# Patient Record
Sex: FEMALE | Race: WHITE | Employment: FULL TIME | URBAN - METROPOLITAN AREA
[De-identification: names, ages, dates, MRNs, and addresses within clinical notes are randomized per-mention and may not be internally consistent; named-entity substitution may affect disease eponyms.]

---

## 2017-06-28 ENCOUNTER — ED HISTORICAL/CONVERTED ENCOUNTER (OUTPATIENT)
Dept: OTHER | Age: 32
End: 2017-06-28

## 2017-08-14 ENCOUNTER — OP HISTORICAL/CONVERTED ENCOUNTER (OUTPATIENT)
Dept: OTHER | Age: 32
End: 2017-08-14

## 2021-08-16 ENCOUNTER — OFFICE VISIT (OUTPATIENT)
Dept: OBGYN CLINIC | Age: 36
End: 2021-08-16
Payer: COMMERCIAL

## 2021-08-16 VITALS
SYSTOLIC BLOOD PRESSURE: 124 MMHG | HEIGHT: 62 IN | DIASTOLIC BLOOD PRESSURE: 76 MMHG | WEIGHT: 180.38 LBS | BODY MASS INDEX: 33.19 KG/M2

## 2021-08-16 DIAGNOSIS — Z12.4 SCREENING FOR MALIGNANT NEOPLASM OF CERVIX: ICD-10-CM

## 2021-08-16 DIAGNOSIS — Z01.419 ROUTINE GYNECOLOGICAL EXAMINATION: Primary | ICD-10-CM

## 2021-08-16 DIAGNOSIS — N94.6 DYSMENORRHEA: ICD-10-CM

## 2021-08-16 PROCEDURE — 99395 PREV VISIT EST AGE 18-39: CPT | Performed by: OBSTETRICS & GYNECOLOGY

## 2021-08-16 RX ORDER — IBUPROFEN 800 MG/1
800 TABLET ORAL
Qty: 30 TABLET | Refills: 1 | Status: SHIPPED | OUTPATIENT
Start: 2021-08-16 | End: 2021-09-05

## 2021-08-16 NOTE — PROGRESS NOTES
Armando Daigle is a 39 y.o. female who presents today for the following:  Chief Complaint   Patient presents with    Annual Exam     c/o painful cycles        Allergies   Allergen Reactions    Tylenol [Acetaminophen] Unknown (comments)       Current Outpatient Medications   Medication Sig    ibuprofen (MOTRIN) 800 mg tablet Take 1 Tablet by mouth every eight (8) hours as needed for Pain for up to 20 days. No current facility-administered medications for this visit. Past Medical History:   Diagnosis Date    Abnormal Papanicolaou smear of cervix 2009    carcinoma in-situ    Depression        Past Surgical History:   Procedure Laterality Date    HX LEEP PROCEDURE  2009       Family History   Problem Relation Age of Onset    Hypertension Mother        Social History     Socioeconomic History    Marital status:      Spouse name: Not on file    Number of children: Not on file    Years of education: Not on file    Highest education level: Not on file   Occupational History    Not on file   Tobacco Use    Smoking status: Never Smoker    Smokeless tobacco: Never Used   Vaping Use    Vaping Use: Never used   Substance and Sexual Activity    Alcohol use: Yes    Drug use: Never    Sexual activity: Yes   Other Topics Concern    Not on file   Social History Narrative    Not on file     Social Determinants of Health     Financial Resource Strain:     Difficulty of Paying Living Expenses:    Food Insecurity:     Worried About Running Out of Food in the Last Year:     [address] in the Last Year:    Transportation Needs:     Lack of Transportation (Medical):      Lack of Transportation (Non-Medical):    Physical Activity:     Days of Exercise per Week:     Minutes of Exercise per Session:    Stress:     Feeling of Stress :    Social Connections:     Frequency of Communication with Friends and Family:     Frequency of Social Gatherings with Friends and Family:     Attends Ep[employer] Services:     Active Member of Clubs or Organizations:     Attends Club or Organization Meetings:     Marital Status:    Intimate Partner Violence:     Fear of Current or Ex-Partner:     Emotionally Abused:     Physically Abused:     Sexually Abused:          HPI  Here today for annual exam.  Patient complains of dysmenorrhea. ROS   Review of Systems   Constitutional: Negative. HENT: Negative. Eyes: Negative. Respiratory: Negative. Cardiovascular: Negative. Gastrointestinal: Negative. Genitourinary: Negative. Musculoskeletal: Negative. Skin: Negative. Neurological: Negative. Endo/Heme/Allergies: Negative. Psychiatric/Behavioral: Negative. /76 (BP 1 Location: Left upper arm, BP Patient Position: Sitting, BP Cuff Size: Adult)   Ht 5' 2\" (1.575 m)   Wt 180 lb 6 oz (81.8 kg)   LMP 07/29/2021 (Exact Date)   BMI 32.99 kg/m²    OBGyn Exam   Constitutional  · Appearance: well-nourished, well developed, alert, in no acute distress    HENT  · Head and Face: appears normal    Neck  · Inspection/Palpation: normal appearance, no masses or tenderness  · Lymph Nodes: no lymphadenopathy present  · Thyroid: gland size normal, nontender, no nodules or masses present on palpation    Breasts   Symmetric, no palpable masses, no tenderness, no skin changes, no nipple abnormality, no nipple discharge, no lymphadenopathy.     Chest  · Respiratory Effort: Even and unlabored  · Auscultation: normal breath sounds    Cardiovascular  · Heart:  · Auscultation: regular rate and rhythm without murmur    Gastrointestinal  · Abdominal Examination: abdomen non-tender to palpation, normal bowel sounds, no masses present  · Liver and spleen: no hepatomegaly present, spleen not palpable  · Hernias: no hernias identified    Genitourinary  · External Genitalia: normal appearance for age, no discharge present, no tenderness present, no inflammatory lesions present, no masses present, no atrophy present  · Vagina: normal vaginal vault without central or paravaginal defects, no discharge present, no inflammatory lesions present, no masses present  · Bladder: non-tender to palpation  · Urethra: appears normal  · Cervix: normal   · Uterus: normal size, shape and consistency  · Adnexa: no adnexal tenderness present, no adnexal masses present  · Perineum: perineum within normal limits, no evidence of trauma, no rashes or skin lesions present  · Anus: anus within normal limits, no hemorrhoids present  · Inguinal Lymph Nodes: no lymphadenopathy present    Skin  · General Inspection: no rash, no lesions identified    Neurologic/Psychiatric  · Mental Status:  · Orientation: grossly oriented to person, place and time  · Mood and Affect: mood normal, affect appropriate    No results found for this visit on 08/16/21. Orders Placed This Encounter    ibuprofen (MOTRIN) 800 mg tablet     Sig: Take 1 Tablet by mouth every eight (8) hours as needed for Pain for up to 20 days. Dispense:  30 Tablet     Refill:  1    PAP IG, CT-NG-TV, APTIMA HPV AND RFX 82/66,56([id number]) ([facility])     Order Specific Question:   Pap Source? Answer:   Endocervical     Order Specific Question:   Total Hysterectomy? Answer:   No     Order Specific Question:   Supracervical Hysterectomy? Answer:   No     Order Specific Question:   Post Menopausal?     Answer:   No     Order Specific Question:   Hormone Therapy? Answer:   No     Order Specific Question:   IUD? Answer:   No     Order Specific Question:   Abnormal Bleeding? Answer:   No     Order Specific Question:   Pregnant     Answer:   No     Order Specific Question:   Post Partum? Answer:   No     Order Specific Question:   Date of LMP? Answer:   7/29/2021     Order Specific Question:   Pap collection method? Answer:   broom         1.  Routine gynecological examination  Reviewed Pap smear/HPV, mammogram, bone density colonoscopy testing guidelines. Encouraged healthy lifestyle. Discussed importanceof getting annual exams. Discussed the risk of osteoporosis and recommended 1200 to 1500 mg of calcium as well as vitamin D supplementation daily. Recommended monthly self breast exams and instructed and demonstrated to the patient on the proper technique educated on the importance of healthy weight management and the significance of not smoking. 2. Screening for malignant neoplasm of cervix    - PAP IG, CT-NG-TV, APTIMA HPV AND RFX 27/86,72([id number])    3. Dysmenorrhea    - ibuprofen (MOTRIN) 800 mg tablet; Take 1 Tablet by mouth every eight (8) hours as needed for Pain for up to 20 days. Dispense: 30 Tablet;  Refill: 1        Follow-up and Dispositions    · Return in about 1 year (around 8/16/2022) for Annual Exam.

## 2021-08-16 NOTE — PROGRESS NOTES
Chief Complaint   Patient presents with    Annual Exam     c/o painful cycles     Visit Vitals  /76 (BP 1 Location: Left upper arm, BP Patient Position: Sitting, BP Cuff Size: Adult)   Ht 5' 2\" (1.575 m)   Wt 180 lb 6 oz (81.8 kg)   LMP 07/29/2021 (Exact Date)   BMI 32.99 kg/m²     1. Have you been to the ER, urgent care clinic since your last visit? Hospitalized since your last visit?no    2. Have you seen or consulted any other health care providers outside of the [address] [place] [address] since your last visit? Include any pap smears or colon screening.  no

## 2021-08-19 LAB
C TRACH RRNA CVX QL NAA+PROBE: NEGATIVE
CYTOLOGIST CVX/VAG CYTO: ABNORMAL
CYTOLOGY CVX/VAG DOC CYTO: ABNORMAL
CYTOLOGY CVX/VAG DOC THIN PREP: ABNORMAL
DX ICD CODE: ABNORMAL
DX ICD CODE: ABNORMAL
HPV I/H RISK 4 DNA CVX QL PROBE+SIG AMP: NEGATIVE
Lab: ABNORMAL
N GONORRHOEA RRNA CVX QL NAA+PROBE: NEGATIVE
OTHER STN SPEC: ABNORMAL
PATHOLOGIST CVX/VAG CYTO: ABNORMAL
STAT OF ADQ CVX/VAG CYTO-IMP: ABNORMAL
T VAGINALIS RRNA SPEC QL NAA+PROBE: NEGATIVE

## 2022-07-19 ENCOUNTER — OFFICE VISIT (OUTPATIENT)
Dept: OBGYN CLINIC | Age: 37
End: 2022-07-19
Payer: COMMERCIAL

## 2022-07-19 VITALS
HEIGHT: 62 IN | BODY MASS INDEX: 34.23 KG/M2 | TEMPERATURE: 97.5 F | DIASTOLIC BLOOD PRESSURE: 72 MMHG | SYSTOLIC BLOOD PRESSURE: 120 MMHG | OXYGEN SATURATION: 98 % | WEIGHT: 186 LBS | HEART RATE: 95 BPM

## 2022-07-19 DIAGNOSIS — R10.2 PELVIC PAIN IN FEMALE: Primary | ICD-10-CM

## 2022-07-19 PROCEDURE — 99213 OFFICE O/P EST LOW 20 MIN: CPT | Performed by: OBSTETRICS & GYNECOLOGY

## 2022-07-19 NOTE — PROGRESS NOTES
Jessica Liao is a 40 y.o. female who presents today for the following:  Chief Complaint   Patient presents with    Pelvic Pain        Allergies   Allergen Reactions    Tylenol [Acetaminophen] Unknown (comments)       No current outpatient medications on file. No current facility-administered medications for this visit. Past Medical History:   Diagnosis Date    Abnormal Papanicolaou smear of cervix 2009    carcinoma in-situ    Depression        Past Surgical History:   Procedure Laterality Date    HX LEEP PROCEDURE  2009       Family History   Problem Relation Age of Onset    Hypertension Mother        Social History     Socioeconomic History    Marital status:      Spouse name: Not on file    Number of children: Not on file    Years of education: Not on file    Highest education level: Not on file   Occupational History    Not on file   Tobacco Use    Smoking status: Never Smoker    Smokeless tobacco: Never Used   Vaping Use    Vaping Use: Never used   Substance and Sexual Activity    Alcohol use: Yes    Drug use: Never    Sexual activity: Yes   Other Topics Concern    Not on file   Social History Narrative    Not on file     Social Determinants of Health     Financial Resource Strain:     Difficulty of Paying Living Expenses: Not on file   Food Insecurity:     Worried About Running Out of Food in the Last Year: Not on file    Abelardo of Food in the Last Year: Not on file   Transportation Needs:     Lack of Transportation (Medical): Not on file    Lack of Transportation (Non-Medical):  Not on file   Physical Activity:     Days of Exercise per Week: Not on file    Minutes of Exercise per Session: Not on file   Stress:     Feeling of Stress : Not on file   Social Connections:     Frequency of Communication with Friends and Family: Not on file    Frequency of Social Gatherings with Friends and Family: Not on file    Attends Yarsanism Services: Not on file   [facility] Active Member of Clubs or Organizations: Not on file    Attends Club or Organization Meetings: Not on file    Marital Status: Not on file   Intimate Partner Violence:     Fear of Current or Ex-Partner: Not on file    Emotionally Abused: Not on file    Physically Abused: Not on file    Sexually Abused: Not on file   Housing Stability:     Unable to Pay for Housing in the Last Year: Not on file    Number of [place] in the Last Year: Not on file    Unstable Housing in the Last Year: Not on file         HPI  40years old patient who presented today with complaints of on and off left-sided pelvic pain of approximately 5 months of evolution. Patient denies constipation, diarrhea, fever, painful intercourse. She also has history of dysmenorrhea. She is a heavy smoker. ROS   Review of Systems   Constitutional: Negative. HENT: Negative. Eyes: Negative. Respiratory: Negative. Cardiovascular: Negative. Gastrointestinal: Negative. Genitourinary: Positive for pelvic pain  Musculoskeletal: Negative. Skin: Negative. Neurological: Negative. Endo/Heme/Allergies: Negative. Psychiatric/Behavioral: Negative.       /72 (BP 1 Location: Left upper arm, BP Patient Position: Sitting, BP Cuff Size: Adult)   Pulse 95   Temp 97.5 °F (36.4 °C) (Temporal)   Ht 5' 2\" (1.575 m)   Wt 186 lb (84.4 kg)   LMP 07/09/2022 (Exact Date)   SpO2 98%   BMI 34.02 kg/m²    OBGyn Exam   Constitutional  · Appearance: well-nourished, well developed, alert, in no acute distress    HENT  · Head and Face: appears normal    Neck  · Inspection/Palpation: normal appearance, no masses or tenderness  · Lymph Nodes: no lymphadenopathy present  · Thyroid: gland size normal, nontender, no nodules or masses present on palpation    Chest  · Respiratory Effort: Even and unlabored  · Auscultation: normal breath sounds    Cardiovascular  · Heart:  · Auscultation: regular rate and rhythm without murmur    Gastrointestinal  · Abdominal Examination: abdomen non-tender to palpation, normal bowel sounds, no masses present  · Liver and spleen: no hepatomegaly present, spleen not palpable  · Hernias: no hernias identified    Genitourinary  · External Genitalia: normal appearance for age, no discharge present, no tenderness present, no inflammatory lesions present, no masses present, no atrophy present  · Vagina: normal vaginal vault without central or paravaginal defects, no discharge present, no inflammatory lesions present, no masses present  · Bladder: non-tender to palpation  · Urethra: appears normal  · Cervix: normal   · Uterus: normal size, shape and consistency  · Adnexa: no adnexal tenderness present, no adnexal masses present  · Perineum: perineum within normal limits, no evidence of trauma, no rashes or skin lesions present  · Anus: anus within normal limits, no hemorrhoids present  · Inguinal Lymph Nodes: no lymphadenopathy present    Skin  · General Inspection: no rash, no lesions identified    Neurologic/Psychiatric  · Mental Status:  · Orientation: grossly oriented to person, place and time  · Mood and Affect: mood normal, affect appropriate    No results found for this visit on 07/19/22. Orders Placed This Encounter    US TRANSVAGINAL     Standing Status:   Future     Standing Expiration Date:   9/19/2022     Order Specific Question:   Is Patient Pregnant? Answer:   Unknown         1. Pelvic pain in female    - US TRANSVAGINAL; Future    Patient oriented to chart her menstrual cycle and pain. A transvaginal pelvic ultrasound ordered to rule out pathology. A follow-up appointment will be scheduled to discuss pelvic ultrasound findings and possible treatment options. Patient oriented and motivated to quit smoking.  (Mirena IUD, etc.)    Follow-up and Dispositions    · Return in about 3 weeks (around 8/9/2022) for Discuss ultrasound results.

## 2022-07-22 ENCOUNTER — TRANSCRIBE ORDER (OUTPATIENT)
Dept: SCHEDULING | Age: 37
End: 2022-07-22

## 2022-07-22 DIAGNOSIS — R10.2 PELVIC PAIN IN FEMALE: Primary | ICD-10-CM

## 2022-07-27 ENCOUNTER — HOSPITAL ENCOUNTER (OUTPATIENT)
Dept: ULTRASOUND IMAGING | Age: 37
Discharge: HOME | End: 2022-07-27
Attending: OBSTETRICS & GYNECOLOGY
Payer: COMMERCIAL

## 2022-07-27 DIAGNOSIS — R10.2 PELVIC PAIN IN FEMALE: ICD-10-CM

## 2022-07-27 PROCEDURE — 76830 TRANSVAGINAL US NON-OB: CPT

## 2022-07-27 PROCEDURE — 76856 US EXAM PELVIC COMPLETE: CPT

## 2023-03-29 ENCOUNTER — HOSPITAL ENCOUNTER (INPATIENT)
Age: 38
LOS: 2 days | Discharge: HOME | DRG: 743 | End: 2023-03-31
Attending: EMERGENCY MEDICINE | Admitting: OBSTETRICS & GYNECOLOGY
Payer: COMMERCIAL

## 2023-03-29 ENCOUNTER — APPOINTMENT (OUTPATIENT)
Dept: ULTRASOUND IMAGING | Age: 38
DRG: 743 | End: 2023-03-29
Attending: EMERGENCY MEDICINE
Payer: COMMERCIAL

## 2023-03-29 ENCOUNTER — ANESTHESIA (OUTPATIENT)
Dept: SURGERY | Age: 38
End: 2023-03-29
Payer: COMMERCIAL

## 2023-03-29 ENCOUNTER — ANESTHESIA EVENT (OUTPATIENT)
Dept: SURGERY | Age: 38
End: 2023-03-29
Payer: COMMERCIAL

## 2023-03-29 ENCOUNTER — APPOINTMENT (OUTPATIENT)
Dept: CT IMAGING | Age: 38
DRG: 743 | End: 2023-03-29
Attending: EMERGENCY MEDICINE
Payer: COMMERCIAL

## 2023-03-29 ENCOUNTER — APPOINTMENT (OUTPATIENT)
Dept: GENERAL RADIOLOGY | Age: 38
DRG: 743 | End: 2023-03-29
Attending: EMERGENCY MEDICINE
Payer: COMMERCIAL

## 2023-03-29 DIAGNOSIS — Z98.890 S/P LAPAROTOMY: ICD-10-CM

## 2023-03-29 DIAGNOSIS — N83.202 CYST OF LEFT OVARY: Primary | ICD-10-CM

## 2023-03-29 PROBLEM — Z90.79 HISTORY OF LEFT SALPINGO-OOPHORECTOMY: Status: ACTIVE | Noted: 2023-03-29

## 2023-03-29 PROBLEM — R10.84 GENERALIZED ABDOMINAL PAIN: Status: ACTIVE | Noted: 2023-03-29

## 2023-03-29 PROBLEM — Z90.721 HISTORY OF LEFT SALPINGO-OOPHORECTOMY: Status: ACTIVE | Noted: 2023-03-29

## 2023-03-29 PROBLEM — N83.209 OVARIAN CYST: Status: ACTIVE | Noted: 2023-03-29

## 2023-03-29 LAB
ALBUMIN SERPL-MCNC: 3.5 G/DL (ref 3.5–5)
ALBUMIN/GLOB SERPL: 0.8 (ref 1.1–2.2)
ALP SERPL-CCNC: 102 U/L (ref 45–117)
ALT SERPL-CCNC: 28 U/L (ref 12–78)
ANION GAP SERPL CALC-SCNC: 8 MMOL/L (ref 5–15)
APPEARANCE UR: ABNORMAL
AST SERPL W P-5'-P-CCNC: 20 U/L (ref 15–37)
ATRIAL RATE: 100 BPM
BACTERIA URNS QL MICRO: NEGATIVE /HPF
BILIRUB SERPL-MCNC: 0.5 MG/DL (ref 0.2–1)
BILIRUB UR QL: NEGATIVE
BUN SERPL-MCNC: 13 MG/DL (ref 6–20)
BUN/CREAT SERPL: 13 (ref 12–20)
CA-I BLD-MCNC: 9.1 MG/DL (ref 8.5–10.1)
CALCULATED P AXIS, ECG09: 50 DEGREES
CALCULATED R AXIS, ECG10: 44 DEGREES
CALCULATED T AXIS, ECG11: 29 DEGREES
CHLORIDE SERPL-SCNC: 109 MMOL/L (ref 97–108)
CO2 SERPL-SCNC: 19 MMOL/L (ref 21–32)
COLOR UR: ABNORMAL
CREAT SERPL-MCNC: 0.97 MG/DL (ref 0.55–1.02)
D DIMER PPP FEU-MCNC: 0.82 UG/ML(FEU)
DIAGNOSIS, 93000: NORMAL
EPITH CASTS URNS QL MICRO: ABNORMAL /LPF
ERYTHROCYTE [DISTWIDTH] IN BLOOD BY AUTOMATED COUNT: 12.2 % (ref 11.5–14.5)
GLOBULIN SER CALC-MCNC: 4.2 G/DL (ref 2–4)
GLUCOSE SERPL-MCNC: 188 MG/DL (ref 65–100)
GLUCOSE UR STRIP.AUTO-MCNC: NEGATIVE MG/DL
HCG SERPL QL: NEGATIVE
HCG UR QL: NEGATIVE
HCT VFR BLD AUTO: 41.7 % (ref 35–47)
HGB BLD-MCNC: 14.1 G/DL (ref 11.5–16)
HGB UR QL STRIP: ABNORMAL
KETONES UR QL STRIP.AUTO: NEGATIVE MG/DL
LEUKOCYTE ESTERASE UR QL STRIP.AUTO: ABNORMAL
LIPASE SERPL-CCNC: 62 U/L (ref 73–393)
MCH RBC QN AUTO: 31.1 PG (ref 26–34)
MCHC RBC AUTO-ENTMCNC: 33.8 G/DL (ref 30–36.5)
MCV RBC AUTO: 92.1 FL (ref 80–99)
MUCOUS THREADS URNS QL MICRO: ABNORMAL /LPF
NITRITE UR QL STRIP.AUTO: NEGATIVE
NRBC # BLD: 0 K/UL (ref 0–0.01)
NRBC BLD-RTO: 0 PER 100 WBC
P-R INTERVAL, ECG05: 116 MS
PH UR STRIP: 5 (ref 5–8)
PLATELET # BLD AUTO: 400 K/UL (ref 150–400)
PMV BLD AUTO: 9.8 FL (ref 8.9–12.9)
POTASSIUM SERPL-SCNC: 3.6 MMOL/L (ref 3.5–5.1)
PROT SERPL-MCNC: 7.7 G/DL (ref 6.4–8.2)
PROT UR STRIP-MCNC: NEGATIVE MG/DL
Q-T INTERVAL, ECG07: 376 MS
QRS DURATION, ECG06: 94 MS
QTC CALCULATION (BEZET), ECG08: 485 MS
RBC # BLD AUTO: 4.53 M/UL (ref 3.8–5.2)
RBC #/AREA URNS HPF: ABNORMAL /HPF (ref 0–5)
SODIUM SERPL-SCNC: 136 MMOL/L (ref 136–145)
SP GR UR REFRACTOMETRY: >1.03 (ref 1–1.03)
UROBILINOGEN UR QL STRIP.AUTO: 0.1 EU/DL (ref 0.1–1)
VENTRICULAR RATE, ECG03: 100 BPM
WBC # BLD AUTO: 11 K/UL (ref 3.6–11)
WBC URNS QL MICRO: ABNORMAL /HPF (ref 0–4)

## 2023-03-29 PROCEDURE — 77030018813 HC SCIS LAPSCP EPIX DISP AMR -B: Performed by: OBSTETRICS & GYNECOLOGY

## 2023-03-29 PROCEDURE — 93005 ELECTROCARDIOGRAM TRACING: CPT

## 2023-03-29 PROCEDURE — 77030003578 HC NDL INSUF VERES AMR -B: Performed by: OBSTETRICS & GYNECOLOGY

## 2023-03-29 PROCEDURE — 74011250637 HC RX REV CODE- 250/637: Performed by: ANESTHESIOLOGY

## 2023-03-29 PROCEDURE — 76857 US EXAM PELVIC LIMITED: CPT

## 2023-03-29 PROCEDURE — 77030016151 HC PROTCTR LNS DFOG COVD -B: Performed by: OBSTETRICS & GYNECOLOGY

## 2023-03-29 PROCEDURE — 77030018390 HC SPNG HEMSTAT2 J&J -B: Performed by: OBSTETRICS & GYNECOLOGY

## 2023-03-29 PROCEDURE — 74011250636 HC RX REV CODE- 250/636: Performed by: EMERGENCY MEDICINE

## 2023-03-29 PROCEDURE — 0UB10ZZ EXCISION OF LEFT OVARY, OPEN APPROACH: ICD-10-PCS | Performed by: OBSTETRICS & GYNECOLOGY

## 2023-03-29 PROCEDURE — 85027 COMPLETE CBC AUTOMATED: CPT

## 2023-03-29 PROCEDURE — 76010000131 HC OR TIME 2 TO 2.5 HR: Performed by: OBSTETRICS & GYNECOLOGY

## 2023-03-29 PROCEDURE — 84703 CHORIONIC GONADOTROPIN ASSAY: CPT

## 2023-03-29 PROCEDURE — 74011000250 HC RX REV CODE- 250: Performed by: NURSE ANESTHETIST, CERTIFIED REGISTERED

## 2023-03-29 PROCEDURE — 74011000250 HC RX REV CODE- 250: Performed by: OBSTETRICS & GYNECOLOGY

## 2023-03-29 PROCEDURE — 85379 FIBRIN DEGRADATION QUANT: CPT

## 2023-03-29 PROCEDURE — 0UT60ZZ RESECTION OF LEFT FALLOPIAN TUBE, OPEN APPROACH: ICD-10-PCS | Performed by: OBSTETRICS & GYNECOLOGY

## 2023-03-29 PROCEDURE — 77030008606 HC TRCR ENDOSC KII AMR -B: Performed by: OBSTETRICS & GYNECOLOGY

## 2023-03-29 PROCEDURE — 2709999900 HC NON-CHARGEABLE SUPPLY: Performed by: OBSTETRICS & GYNECOLOGY

## 2023-03-29 PROCEDURE — 96374 THER/PROPH/DIAG INJ IV PUSH: CPT

## 2023-03-29 PROCEDURE — 74011250637 HC RX REV CODE- 250/637: Performed by: OBSTETRICS & GYNECOLOGY

## 2023-03-29 PROCEDURE — 77030031139 HC SUT VCRL2 J&J -A: Performed by: OBSTETRICS & GYNECOLOGY

## 2023-03-29 PROCEDURE — 81025 URINE PREGNANCY TEST: CPT

## 2023-03-29 PROCEDURE — 81001 URINALYSIS AUTO W/SCOPE: CPT

## 2023-03-29 PROCEDURE — 99285 EMERGENCY DEPT VISIT HI MDM: CPT

## 2023-03-29 PROCEDURE — 96376 TX/PRO/DX INJ SAME DRUG ADON: CPT

## 2023-03-29 PROCEDURE — 76856 US EXAM PELVIC COMPLETE: CPT

## 2023-03-29 PROCEDURE — 76830 TRANSVAGINAL US NON-OB: CPT

## 2023-03-29 PROCEDURE — 77030011278 HC ELECTRD LIG IMPT COVD -F: Performed by: OBSTETRICS & GYNECOLOGY

## 2023-03-29 PROCEDURE — 65410000002 HC RM PRIVATE OB

## 2023-03-29 PROCEDURE — 83690 ASSAY OF LIPASE: CPT

## 2023-03-29 PROCEDURE — 77030041630 HC DISECTR ENDOSC DERY -B: Performed by: OBSTETRICS & GYNECOLOGY

## 2023-03-29 PROCEDURE — 80053 COMPREHEN METABOLIC PANEL: CPT

## 2023-03-29 PROCEDURE — 74011000636 HC RX REV CODE- 636: Performed by: EMERGENCY MEDICINE

## 2023-03-29 PROCEDURE — 76060000035 HC ANESTHESIA 2 TO 2.5 HR: Performed by: OBSTETRICS & GYNECOLOGY

## 2023-03-29 PROCEDURE — 77030009403 HC ELECTRD ENDO MEGA -B: Performed by: OBSTETRICS & GYNECOLOGY

## 2023-03-29 PROCEDURE — 74011000250 HC RX REV CODE- 250: Performed by: EMERGENCY MEDICINE

## 2023-03-29 PROCEDURE — 77030002933 HC SUT MCRYL J&J -A: Performed by: OBSTETRICS & GYNECOLOGY

## 2023-03-29 PROCEDURE — 71045 X-RAY EXAM CHEST 1 VIEW: CPT

## 2023-03-29 PROCEDURE — 76210000006 HC OR PH I REC 0.5 TO 1 HR: Performed by: OBSTETRICS & GYNECOLOGY

## 2023-03-29 PROCEDURE — 96375 TX/PRO/DX INJ NEW DRUG ADDON: CPT

## 2023-03-29 PROCEDURE — 77030040361 HC SLV COMPR DVT MDII -B: Performed by: OBSTETRICS & GYNECOLOGY

## 2023-03-29 PROCEDURE — 74177 CT ABD & PELVIS W/CONTRAST: CPT

## 2023-03-29 PROCEDURE — 77030010507 HC ADH SKN DERMBND J&J -B: Performed by: OBSTETRICS & GYNECOLOGY

## 2023-03-29 PROCEDURE — 36415 COLL VENOUS BLD VENIPUNCTURE: CPT

## 2023-03-29 PROCEDURE — 99222 1ST HOSP IP/OBS MODERATE 55: CPT | Performed by: OBSTETRICS & GYNECOLOGY

## 2023-03-29 PROCEDURE — 88305 TISSUE EXAM BY PATHOLOGIST: CPT

## 2023-03-29 PROCEDURE — 74011250636 HC RX REV CODE- 250/636: Performed by: OBSTETRICS & GYNECOLOGY

## 2023-03-29 PROCEDURE — 71275 CT ANGIOGRAPHY CHEST: CPT

## 2023-03-29 PROCEDURE — 77030018684: Performed by: OBSTETRICS & GYNECOLOGY

## 2023-03-29 PROCEDURE — 74011250636 HC RX REV CODE- 250/636: Performed by: NURSE ANESTHETIST, CERTIFIED REGISTERED

## 2023-03-29 RX ORDER — ONDANSETRON 2 MG/ML
4 INJECTION INTRAMUSCULAR; INTRAVENOUS
Status: DISCONTINUED | OUTPATIENT
Start: 2023-03-29 | End: 2023-03-31 | Stop reason: HOSPADM

## 2023-03-29 RX ORDER — OXYCODONE HYDROCHLORIDE 5 MG/1
5 TABLET ORAL
Status: DISCONTINUED | OUTPATIENT
Start: 2023-03-29 | End: 2023-03-31 | Stop reason: HOSPADM

## 2023-03-29 RX ORDER — BUPIVACAINE HYDROCHLORIDE 5 MG/ML
INJECTION, SOLUTION PERINEURAL AS NEEDED
Status: DISCONTINUED | OUTPATIENT
Start: 2023-03-29 | End: 2023-03-29 | Stop reason: HOSPADM

## 2023-03-29 RX ORDER — MIDAZOLAM HYDROCHLORIDE 1 MG/ML
1 INJECTION, SOLUTION INTRAMUSCULAR; INTRAVENOUS AS NEEDED
Status: DISCONTINUED | OUTPATIENT
Start: 2023-03-29 | End: 2023-03-29 | Stop reason: HOSPADM

## 2023-03-29 RX ORDER — SODIUM CHLORIDE, SODIUM LACTATE, POTASSIUM CHLORIDE, CALCIUM CHLORIDE 600; 310; 30; 20 MG/100ML; MG/100ML; MG/100ML; MG/100ML
125 INJECTION, SOLUTION INTRAVENOUS CONTINUOUS
Status: DISCONTINUED | OUTPATIENT
Start: 2023-03-29 | End: 2023-03-31 | Stop reason: HOSPADM

## 2023-03-29 RX ORDER — DEXAMETHASONE SODIUM PHOSPHATE 4 MG/ML
INJECTION, SOLUTION INTRA-ARTICULAR; INTRALESIONAL; INTRAMUSCULAR; INTRAVENOUS; SOFT TISSUE AS NEEDED
Status: DISCONTINUED | OUTPATIENT
Start: 2023-03-29 | End: 2023-03-29 | Stop reason: HOSPADM

## 2023-03-29 RX ORDER — SUCCINYLCHOLINE CHLORIDE 20 MG/ML
INJECTION INTRAMUSCULAR; INTRAVENOUS AS NEEDED
Status: DISCONTINUED | OUTPATIENT
Start: 2023-03-29 | End: 2023-03-29 | Stop reason: HOSPADM

## 2023-03-29 RX ORDER — DOCUSATE SODIUM 100 MG/1
100 CAPSULE, LIQUID FILLED ORAL 2 TIMES DAILY
Status: DISCONTINUED | OUTPATIENT
Start: 2023-03-29 | End: 2023-03-31 | Stop reason: HOSPADM

## 2023-03-29 RX ORDER — DIPHENHYDRAMINE HYDROCHLORIDE 50 MG/ML
25 INJECTION, SOLUTION INTRAMUSCULAR; INTRAVENOUS
Status: DISCONTINUED | OUTPATIENT
Start: 2023-03-29 | End: 2023-03-31 | Stop reason: HOSPADM

## 2023-03-29 RX ORDER — SODIUM CHLORIDE 0.9 % (FLUSH) 0.9 %
5-40 SYRINGE (ML) INJECTION EVERY 8 HOURS
Status: DISCONTINUED | OUTPATIENT
Start: 2023-03-29 | End: 2023-03-31 | Stop reason: HOSPADM

## 2023-03-29 RX ORDER — HYDROMORPHONE HYDROCHLORIDE 1 MG/ML
1 INJECTION, SOLUTION INTRAMUSCULAR; INTRAVENOUS; SUBCUTANEOUS ONCE
Status: COMPLETED | OUTPATIENT
Start: 2023-03-29 | End: 2023-03-29

## 2023-03-29 RX ORDER — MIDAZOLAM HYDROCHLORIDE 1 MG/ML
INJECTION, SOLUTION INTRAMUSCULAR; INTRAVENOUS AS NEEDED
Status: DISCONTINUED | OUTPATIENT
Start: 2023-03-29 | End: 2023-03-29 | Stop reason: HOSPADM

## 2023-03-29 RX ORDER — GLYCOPYRROLATE 0.2 MG/ML
INJECTION INTRAMUSCULAR; INTRAVENOUS AS NEEDED
Status: DISCONTINUED | OUTPATIENT
Start: 2023-03-29 | End: 2023-03-29 | Stop reason: HOSPADM

## 2023-03-29 RX ORDER — DIPHENHYDRAMINE HYDROCHLORIDE 50 MG/ML
12.5 INJECTION, SOLUTION INTRAMUSCULAR; INTRAVENOUS AS NEEDED
Status: DISCONTINUED | OUTPATIENT
Start: 2023-03-29 | End: 2023-03-29 | Stop reason: HOSPADM

## 2023-03-29 RX ORDER — SODIUM CHLORIDE 0.9 % (FLUSH) 0.9 %
5-40 SYRINGE (ML) INJECTION EVERY 8 HOURS
Status: DISCONTINUED | OUTPATIENT
Start: 2023-03-29 | End: 2023-03-29 | Stop reason: HOSPADM

## 2023-03-29 RX ORDER — ROCURONIUM BROMIDE 10 MG/ML
INJECTION, SOLUTION INTRAVENOUS AS NEEDED
Status: DISCONTINUED | OUTPATIENT
Start: 2023-03-29 | End: 2023-03-29 | Stop reason: HOSPADM

## 2023-03-29 RX ORDER — PROPOFOL 10 MG/ML
INJECTION, EMULSION INTRAVENOUS AS NEEDED
Status: DISCONTINUED | OUTPATIENT
Start: 2023-03-29 | End: 2023-03-29 | Stop reason: HOSPADM

## 2023-03-29 RX ORDER — KETOROLAC TROMETHAMINE 30 MG/ML
30 INJECTION, SOLUTION INTRAMUSCULAR; INTRAVENOUS
Status: DISPENSED | OUTPATIENT
Start: 2023-03-29 | End: 2023-03-30

## 2023-03-29 RX ORDER — ONDANSETRON 2 MG/ML
4 INJECTION INTRAMUSCULAR; INTRAVENOUS ONCE
Status: COMPLETED | OUTPATIENT
Start: 2023-03-29 | End: 2023-03-29

## 2023-03-29 RX ORDER — SODIUM CHLORIDE, SODIUM LACTATE, POTASSIUM CHLORIDE, CALCIUM CHLORIDE 600; 310; 30; 20 MG/100ML; MG/100ML; MG/100ML; MG/100ML
INJECTION, SOLUTION INTRAVENOUS
Status: DISCONTINUED | OUTPATIENT
Start: 2023-03-29 | End: 2023-03-29 | Stop reason: HOSPADM

## 2023-03-29 RX ORDER — MIDAZOLAM HYDROCHLORIDE 1 MG/ML
0.5 INJECTION, SOLUTION INTRAMUSCULAR; INTRAVENOUS
Status: DISCONTINUED | OUTPATIENT
Start: 2023-03-29 | End: 2023-03-29 | Stop reason: HOSPADM

## 2023-03-29 RX ORDER — HYDROMORPHONE HYDROCHLORIDE 1 MG/ML
INJECTION, SOLUTION INTRAMUSCULAR; INTRAVENOUS; SUBCUTANEOUS AS NEEDED
Status: DISCONTINUED | OUTPATIENT
Start: 2023-03-29 | End: 2023-03-29 | Stop reason: HOSPADM

## 2023-03-29 RX ORDER — OXYCODONE HYDROCHLORIDE 10 MG/1
10 TABLET ORAL
Status: DISCONTINUED | OUTPATIENT
Start: 2023-03-29 | End: 2023-03-31 | Stop reason: HOSPADM

## 2023-03-29 RX ORDER — FENTANYL CITRATE 50 UG/ML
50 INJECTION, SOLUTION INTRAMUSCULAR; INTRAVENOUS AS NEEDED
Status: DISCONTINUED | OUTPATIENT
Start: 2023-03-29 | End: 2023-03-29 | Stop reason: HOSPADM

## 2023-03-29 RX ORDER — NALOXONE HYDROCHLORIDE 0.4 MG/ML
0.4 INJECTION, SOLUTION INTRAMUSCULAR; INTRAVENOUS; SUBCUTANEOUS AS NEEDED
Status: DISCONTINUED | OUTPATIENT
Start: 2023-03-29 | End: 2023-03-31 | Stop reason: HOSPADM

## 2023-03-29 RX ORDER — MORPHINE SULFATE 10 MG/ML
2 INJECTION, SOLUTION INTRAMUSCULAR; INTRAVENOUS
Status: DISCONTINUED | OUTPATIENT
Start: 2023-03-29 | End: 2023-03-29 | Stop reason: HOSPADM

## 2023-03-29 RX ORDER — LIDOCAINE HYDROCHLORIDE 20 MG/ML
INJECTION, SOLUTION EPIDURAL; INFILTRATION; INTRACAUDAL; PERINEURAL AS NEEDED
Status: DISCONTINUED | OUTPATIENT
Start: 2023-03-29 | End: 2023-03-29 | Stop reason: HOSPADM

## 2023-03-29 RX ORDER — SODIUM CHLORIDE 0.9 % (FLUSH) 0.9 %
5-40 SYRINGE (ML) INJECTION AS NEEDED
Status: DISCONTINUED | OUTPATIENT
Start: 2023-03-29 | End: 2023-03-31 | Stop reason: HOSPADM

## 2023-03-29 RX ORDER — HYDROMORPHONE HYDROCHLORIDE 1 MG/ML
0.5 INJECTION, SOLUTION INTRAMUSCULAR; INTRAVENOUS; SUBCUTANEOUS
Status: DISCONTINUED | OUTPATIENT
Start: 2023-03-29 | End: 2023-03-29 | Stop reason: HOSPADM

## 2023-03-29 RX ORDER — LIDOCAINE HYDROCHLORIDE 10 MG/ML
0.1 INJECTION, SOLUTION EPIDURAL; INFILTRATION; INTRACAUDAL; PERINEURAL AS NEEDED
Status: DISCONTINUED | OUTPATIENT
Start: 2023-03-29 | End: 2023-03-29 | Stop reason: HOSPADM

## 2023-03-29 RX ORDER — SODIUM CHLORIDE 0.9 % (FLUSH) 0.9 %
5-40 SYRINGE (ML) INJECTION AS NEEDED
Status: DISCONTINUED | OUTPATIENT
Start: 2023-03-29 | End: 2023-03-29 | Stop reason: HOSPADM

## 2023-03-29 RX ORDER — FENTANYL CITRATE 50 UG/ML
INJECTION, SOLUTION INTRAMUSCULAR; INTRAVENOUS AS NEEDED
Status: DISCONTINUED | OUTPATIENT
Start: 2023-03-29 | End: 2023-03-29 | Stop reason: HOSPADM

## 2023-03-29 RX ORDER — FENTANYL CITRATE 50 UG/ML
50 INJECTION, SOLUTION INTRAMUSCULAR; INTRAVENOUS
Status: DISCONTINUED | OUTPATIENT
Start: 2023-03-29 | End: 2023-03-29 | Stop reason: HOSPADM

## 2023-03-29 RX ORDER — NORETHINDRONE AND ETHINYL ESTRADIOL 0.5-0.035
5 KIT ORAL AS NEEDED
Status: DISCONTINUED | OUTPATIENT
Start: 2023-03-29 | End: 2023-03-29 | Stop reason: HOSPADM

## 2023-03-29 RX ORDER — SCOLOPAMINE TRANSDERMAL SYSTEM 1 MG/1
1 PATCH, EXTENDED RELEASE TRANSDERMAL
Status: DISCONTINUED | OUTPATIENT
Start: 2023-03-29 | End: 2023-03-31 | Stop reason: HOSPADM

## 2023-03-29 RX ORDER — KETOROLAC TROMETHAMINE 30 MG/ML
INJECTION, SOLUTION INTRAMUSCULAR; INTRAVENOUS AS NEEDED
Status: DISCONTINUED | OUTPATIENT
Start: 2023-03-29 | End: 2023-03-29 | Stop reason: HOSPADM

## 2023-03-29 RX ORDER — ONDANSETRON 2 MG/ML
INJECTION INTRAMUSCULAR; INTRAVENOUS AS NEEDED
Status: DISCONTINUED | OUTPATIENT
Start: 2023-03-29 | End: 2023-03-29 | Stop reason: HOSPADM

## 2023-03-29 RX ORDER — ONDANSETRON 2 MG/ML
4 INJECTION INTRAMUSCULAR; INTRAVENOUS AS NEEDED
Status: DISCONTINUED | OUTPATIENT
Start: 2023-03-29 | End: 2023-03-29 | Stop reason: HOSPADM

## 2023-03-29 RX ORDER — ALBUTEROL SULFATE 90 UG/1
2 AEROSOL, METERED RESPIRATORY (INHALATION)
Status: DISCONTINUED | OUTPATIENT
Start: 2023-03-29 | End: 2023-03-31 | Stop reason: HOSPADM

## 2023-03-29 RX ORDER — FENTANYL CITRATE 50 UG/ML
50 INJECTION, SOLUTION INTRAMUSCULAR; INTRAVENOUS
Status: DISCONTINUED | OUTPATIENT
Start: 2023-03-29 | End: 2023-03-31 | Stop reason: HOSPADM

## 2023-03-29 RX ADMIN — FENTANYL CITRATE 50 MCG: 50 INJECTION, SOLUTION INTRAMUSCULAR; INTRAVENOUS at 13:13

## 2023-03-29 RX ADMIN — SUGAMMADEX 200 MG: 100 INJECTION, SOLUTION INTRAVENOUS at 15:20

## 2023-03-29 RX ADMIN — KETOROLAC TROMETHAMINE 30 MG: 30 INJECTION, SOLUTION INTRAMUSCULAR at 23:46

## 2023-03-29 RX ADMIN — ONDANSETRON 4 MG: 2 INJECTION INTRAMUSCULAR; INTRAVENOUS at 21:16

## 2023-03-29 RX ADMIN — IOPAMIDOL 100 ML: 755 INJECTION, SOLUTION INTRAVENOUS at 09:52

## 2023-03-29 RX ADMIN — SODIUM CHLORIDE 1000 ML: 9 INJECTION, SOLUTION INTRAVENOUS at 08:46

## 2023-03-29 RX ADMIN — FENTANYL CITRATE 50 MCG: 50 INJECTION, SOLUTION INTRAMUSCULAR; INTRAVENOUS at 13:02

## 2023-03-29 RX ADMIN — FENTANYL CITRATE 50 MCG: 50 INJECTION, SOLUTION INTRAMUSCULAR; INTRAVENOUS at 14:17

## 2023-03-29 RX ADMIN — SODIUM CHLORIDE, POTASSIUM CHLORIDE, SODIUM LACTATE AND CALCIUM CHLORIDE 125 ML/HR: 600; 310; 30; 20 INJECTION, SOLUTION INTRAVENOUS at 23:46

## 2023-03-29 RX ADMIN — PROPOFOL 150 MG: 10 INJECTION, EMULSION INTRAVENOUS at 13:13

## 2023-03-29 RX ADMIN — CEFTRIAXONE SODIUM 1 G: 1 INJECTION, POWDER, FOR SOLUTION INTRAMUSCULAR; INTRAVENOUS at 11:52

## 2023-03-29 RX ADMIN — ROCURONIUM BROMIDE 10 MG: 10 INJECTION, SOLUTION INTRAVENOUS at 13:13

## 2023-03-29 RX ADMIN — KETOROLAC TROMETHAMINE 30 MG: 30 INJECTION, SOLUTION INTRAMUSCULAR at 15:03

## 2023-03-29 RX ADMIN — ONDANSETRON 4 MG: 2 INJECTION INTRAMUSCULAR; INTRAVENOUS at 13:25

## 2023-03-29 RX ADMIN — DEXAMETHASONE SODIUM PHOSPHATE 4 MG: 4 INJECTION, SOLUTION INTRA-ARTICULAR; INTRALESIONAL; INTRAMUSCULAR; INTRAVENOUS; SOFT TISSUE at 13:25

## 2023-03-29 RX ADMIN — LIDOCAINE HYDROCHLORIDE 100 MG: 20 INJECTION, SOLUTION EPIDURAL; INFILTRATION; INTRACAUDAL; PERINEURAL at 13:13

## 2023-03-29 RX ADMIN — SODIUM CHLORIDE, PRESERVATIVE FREE 20 MG: 5 INJECTION INTRAVENOUS at 09:22

## 2023-03-29 RX ADMIN — MIDAZOLAM HYDROCHLORIDE 2 MG: 2 INJECTION, SOLUTION INTRAMUSCULAR; INTRAVENOUS at 13:02

## 2023-03-29 RX ADMIN — GLYCOPYRROLATE 0.2 MG: 0.2 INJECTION INTRAMUSCULAR; INTRAVENOUS at 13:02

## 2023-03-29 RX ADMIN — SUCCINYLCHOLINE CHLORIDE 120 MG: 20 INJECTION, SOLUTION INTRAMUSCULAR; INTRAVENOUS at 13:13

## 2023-03-29 RX ADMIN — HYDROMORPHONE HYDROCHLORIDE 1 MG: 1 INJECTION, SOLUTION INTRAMUSCULAR; INTRAVENOUS; SUBCUTANEOUS at 08:10

## 2023-03-29 RX ADMIN — ROCURONIUM BROMIDE 10 MG: 10 INJECTION, SOLUTION INTRAVENOUS at 14:35

## 2023-03-29 RX ADMIN — HYDROMORPHONE HYDROCHLORIDE 1 MG: 1 INJECTION, SOLUTION INTRAMUSCULAR; INTRAVENOUS; SUBCUTANEOUS at 11:51

## 2023-03-29 RX ADMIN — SODIUM CHLORIDE, POTASSIUM CHLORIDE, SODIUM LACTATE AND CALCIUM CHLORIDE: 600; 310; 30; 20 INJECTION, SOLUTION INTRAVENOUS at 13:02

## 2023-03-29 RX ADMIN — OXYCODONE HYDROCHLORIDE 5 MG: 5 TABLET ORAL at 18:46

## 2023-03-29 RX ADMIN — HYDROMORPHONE HYDROCHLORIDE 1 MG: 1 INJECTION, SOLUTION INTRAMUSCULAR; INTRAVENOUS; SUBCUTANEOUS at 09:22

## 2023-03-29 RX ADMIN — ONDANSETRON 4 MG: 2 INJECTION INTRAMUSCULAR; INTRAVENOUS at 08:07

## 2023-03-29 RX ADMIN — ROCURONIUM BROMIDE 10 MG: 10 INJECTION, SOLUTION INTRAVENOUS at 14:14

## 2023-03-29 RX ADMIN — FENTANYL CITRATE 50 MCG: 50 INJECTION, SOLUTION INTRAMUSCULAR; INTRAVENOUS at 13:57

## 2023-03-29 RX ADMIN — SODIUM CHLORIDE, PRESERVATIVE FREE 20 MG: 5 INJECTION INTRAVENOUS at 21:16

## 2023-03-29 RX ADMIN — SODIUM CHLORIDE, POTASSIUM CHLORIDE, SODIUM LACTATE AND CALCIUM CHLORIDE: 600; 310; 30; 20 INJECTION, SOLUTION INTRAVENOUS at 15:04

## 2023-03-29 RX ADMIN — ROCURONIUM BROMIDE 40 MG: 10 INJECTION, SOLUTION INTRAVENOUS at 13:19

## 2023-03-29 RX ADMIN — HYDROMORPHONE HYDROCHLORIDE 1 MG: 1 INJECTION, SOLUTION INTRAMUSCULAR; INTRAVENOUS; SUBCUTANEOUS at 14:28

## 2023-03-29 NOTE — ROUTINE PROCESS
TRANSFER - OUT REPORT:    Verbal report given to Leticia Lindsey on Callie Presybeterian  being transferred to PACU/OR for routine progression of care       Report consisted of patients Situation, Background, Assessment and   Recommendations(SBAR). Information from the following report(s) SBAR, ED Summary, Recent Results, and Cardiac Rhythm sinus tach  was reviewed with the receiving nurse. Lines:   Peripheral IV 03/29/23 Anterior; Left Forearm (Active)        Opportunity for questions and clarification was provided.       Patient transported with:   [employer]

## 2023-03-29 NOTE — PROGRESS NOTES
Problem: Surgical Pathway Day of Surgery  Goal: Activity/Safety  3/29/2023 1953 by Abelardo Potts RN  Outcome: Progressing Towards Goal  3/29/2023 1952 by Abelardo Potts RN  Outcome: Progressing Towards Goal  Goal: Nutrition/Diet  3/29/2023 1953 by Abelardo Potts RN  Outcome: Progressing Towards Goal  3/29/2023 1952 by Abelardo Potts RN  Outcome: Progressing Towards Goal  Goal: Medications  3/29/2023 1953 by Abelardo Potts RN  Outcome: Progressing Towards Goal  3/29/2023 1952 by Abelardo Potts RN  Outcome: Progressing Towards Goal  Goal: Respiratory  3/29/2023 1953 by Abelardo Potts RN  Outcome: Progressing Towards Goal  3/29/2023 1952 by Abelardo Potts RN  Outcome: Progressing Towards Goal  Goal: Treatments/Interventions/Procedures  3/29/2023 1953 by Abelardo Potts RN  Outcome: Progressing Towards Goal  3/29/2023 1952 by Abelardo Potts RN  Outcome: Progressing Towards Goal  Goal: *No signs and symptoms of infection or wound complications  6/90/8151 1953 by Abelardo Potts RN  Outcome: Progressing Towards Goal  3/29/2023 1952 by Abelardo Potts RN  Outcome: Progressing Towards Goal  Goal: *Optimal pain control at patient's stated goal  3/29/2023 1953 by Abelardo Potts RN  Outcome: Progressing Towards Goal  3/29/2023 1952 by Abelardo Potts RN  Outcome: Progressing Towards Goal  Goal: *Adequate urinary output (equal to or greater than 30 milliliters/hour)  Description: Ambulatory Surgery patients voiding without difficulty.   3/29/2023 1953 by Abelardo Potts RN  Outcome: Progressing Towards Goal  3/29/2023 1952 by Abelardo Potts RN  Outcome: Progressing Towards Goal  Goal: *Hemodynamically stable  3/29/2023 1953 by Abelardo Potts RN  Outcome: Progressing Towards Goal  3/29/2023 1952 by Abelardo Potts RN  Outcome: Progressing Towards Goal  Goal: *Tolerating diet  3/29/2023 1953 by Abelardo Potts RN  Outcome: Progressing Towards Goal  3/29/2023 1952 by [employer], Daria Sousa RN  Outcome: Progressing Towards Goal  Goal: *Demonstrates progressive activity  3/29/2023 1953 by Brina Dumont RN  Outcome: Progressing Towards Goal  3/29/2023 1952 by Brina Dumont RN  Outcome: Progressing Towards Goal

## 2023-03-29 NOTE — PROGRESS NOTES
Problem: Surgical Pathway Day of Surgery  Goal: Activity/Safety  Outcome: Progressing Towards Goal  Goal: Nutrition/Diet  Outcome: Progressing Towards Goal  Goal: Medications  Outcome: Progressing Towards Goal  Goal: Respiratory  Outcome: Progressing Towards Goal  Goal: Treatments/Interventions/Procedures  Outcome: Progressing Towards Goal  Goal: *No signs and symptoms of infection or wound complications  Outcome: Progressing Towards Goal  Goal: *Optimal pain control at patient's stated goal  Outcome: Progressing Towards Goal  Goal: *Adequate urinary output (equal to or greater than 30 milliliters/hour)  Description: Ambulatory Surgery patients voiding without difficulty.   Outcome: Progressing Towards Goal  Goal: *Hemodynamically stable  Outcome: Progressing Towards Goal  Goal: *Tolerating diet  Outcome: Progressing Towards Goal  Goal: *Demonstrates progressive activity  Outcome: Progressing Towards Goal

## 2023-03-29 NOTE — OP NOTES
Operative Note    Patient: Miguel Khan  YOB: 1985  MRN: [id number]    Date of Procedure: 3/29/2023     Pre-Op Diagnosis: Left ovarian cyst, possible torsion    Post-Op Diagnosis: Ruptured left ovarian cyst- endometrioma       Procedure(s):  LAPAROSCOPY DIAGNOSTIC converted to laparotomy, LEFT SALPINGECTOMY, LEFT PARTIAL OOPHERECTOMY    Surgeon(s):  Maik Mosley MD    Surgical Assistant: Surg Asst-1: Yamini Masters, surgical assist    Anesthesia: General     Estimated Blood Loss (mL):  350ccs of blood and fluid in abdomen noted upon entry    Complications: None    Specimens:   ID Type Source Tests Collected by Time Destination   1 : LEFT OVARY AND LEFT FALLOPIAN TUBE Preservative OTHER (use comments)  Maik Mosley MD 3/29/2023 1445 Pathology        Implants: * No implants in log *    Drains:   Orogastric Tube 03/29/23 (Active)     Indication: 44 yo F who presented with severe abdominal pain and findings of  7.7 cm left adnexal multicystic mass, cannot rule out torsion    Findings: Multicystic left ovary with multiple chocolate fluid filled cysts consistent with endometriomas, normal appearing left fallopian tube, slightly bleeding; Left adnexa adhered to bowel and lower posterior uterus and pelvic side wall, normal appearing right fallopian tube and ovary, normal uterus    Detailed Description of Procedure: The patient was consented for diagnostic laparoscopy, possible removal of right and or left ovarian cyst, oophorectomy, or salpingectomy. She understood that risks of the procedure include but are not limited to bleeding, need for transfusion and its associated risks, injury to nearby organs including bowel, bladder, ureters, nerves, blood vessels, need for re-operation, prolonged hospital stay, VTE. She desired to proceed with the procedure. She was taken to the operating room where she received general anesthesia without difficulty.  She was placed in the dorsal lithotomy position with her lower extremities in pat stirrups. She was prepped and draped in the normal sterile fashion. Surgical timeout was completed. A sponge stick was placed into the vagina. The urethra was prepped with Betadine and the bladder was drained via straight cath. Attention was paid to the abdominal portion of the procedure. Local anesthesia was applied to the infraumbilical region. A vetical 5 mm incision was made via a scapel. The veress needle was inserted into the abdomen until 2 pops were heard. Saline drop test was performed and there was no aspiration of bile or blood. CO2 was connected with opening pressure of 5mm Hg. A 5mm trocar was inserted into the abdomen under direct visualization. Abdominal findings per above. A 5mm accessory port was placed in the right lower quadrant, and a 5mm accessory port was placed in the left lower quadrant. The left ovary was noted to have multiple endometriomas with some of them actively draining/ruptured. Decision made to convert to laparotomy. All the instruments were removed from the abdomen. The laparoscopic skin sites were closed via interrupteds using 4-0 Monocryl. Hemostasis was noted. A pfannenstiel skin incision was made via the scapel. The incision was carried down to the level of the fascia. Hemostasis was achieved via the use of the Bovie. The fascia was then sharply incised in an upper and lateral tilt using curved Scanlon scissors. The superior aspect of the fascia was grasped with Kocher clamps and the underlying rectus muscles were sharply dissected off using Scanlon scissors. In a similar fashion, the inferior aspect of the fascia were grasped with Kocher clamps and the underlying rectus and pyramidalis muscles were sharply dissected off. Peritoneum was bluntly entered. The peritoneal incision was extended in a lateral fashion bluntly. Aforementioned findings were noted.  An Roberto Huge retractor was inserted into the abdomen and the bowel was packed. The left ovary was dissected sharply and bluntly to the base of the ovary where dense adhesions were still noted to the posterior uterus. The endometriomas that were already ruptured were drained. The ligasure was then used to serially coagulate and transect the left ovary containing the endometriomas down to the base of the ovary. Due to dense adhesions still, a partial oophorectomy was performed. The ovarian bed was then sutured with 0-0 vicryl via running locked stiches for hemostasis. A left salpingectomy was performed by serially coagulating and transecting the mesosalpinx from fimbriated end to the isthmus via the Ligasure. Hemostasis was noted. Copious irrigation was performed. Derrick was applied to the surgical site. All instruments were removed from the body. Abdominal wound sweep was negative. The underlying rectus muscles and fascia were examined and bleeding was made hemostatic. The peritoneum was closed with 2-0 vicryl in a running fashion transversely. The fascia was then closed with 0-0 Vicryl in a continuous running fashion. The subcutaneous layer was reapproximated using 3-0 vicryl in a running fashion. The skin was closed with 4-0 Monocryl via a subcuticular stitch. The specimens were sent to the pathologist for analysis. The patient was awoken without difficulty. Counts were correct ×2. She was taken to the recovery room in stable condition. She'll remain inpatient for routine post operative care.     Electronically Signed by Lori Ferguson MD on 3/29/2023 at 4:17 PM ambulatory

## 2023-03-29 NOTE — PROGRESS NOTES
TRANSFER - IN REPORT:    Verbal report received from [facility] on Perla Young  being received from PACU for routine post - op      Report consisted of patients Situation, Background, Assessment and   Recommendations(SBAR). Information from the following report(s) SBAR was reviewed with the receiving nurse. Opportunity for questions and clarification was provided. Assessment completed upon patients arrival to unit and care assumed. 1745 PO fluids given     1620 Transferred from PACU. Room orientation completed. Hourly rounding and bedside shift report discussed. Fall prevention patient agreement sheet reviewed. Instructed to call first time ambulating due to fall precautions. Call bell within reach. Problem: Surgical Pathway Day of Surgery  Goal: Activity/Safety  Outcome: Progressing Towards Goal  Goal: Nutrition/Diet  Outcome: Progressing Towards Goal  Goal: Medications  Outcome: Progressing Towards Goal  Goal: Respiratory  Outcome: Progressing Towards Goal  Goal: Treatments/Interventions/Procedures  Outcome: Progressing Towards Goal  Goal: *No signs and symptoms of infection or wound complications  Outcome: Progressing Towards Goal  Goal: *Optimal pain control at patient's stated goal  Outcome: Progressing Towards Goal  Goal: *Adequate urinary output (equal to or greater than 30 milliliters/hour)  Description: Ambulatory Surgery patients voiding without difficulty.   Outcome: Progressing Towards Goal  Goal: *Hemodynamically stable  Outcome: Progressing Towards Goal  Goal: *Tolerating diet  Outcome: Progressing Towards Goal

## 2023-03-29 NOTE — PERIOP NOTES
Post Anesthetic Care Unit Report    Situation  Patients Name: Sayra Hill  : 1985  Doctors Name: Royal Rogers  Medical History: None  Allergies: Tylenol  Code Status: Not on file    Background   Type of Anesthesia: General with ET Tube  Medication Used: 2mg of versed, 4mg of decadron, 4mg of zofran, 200mcg of fentanyl, 1mg of dilaudid, 30mg of toradol, 150mg of propofol, 70mg of rocuronium, 120mg of succinylcholyne, 01 path os scopolamine     Procedure Performed: Laparotomy with left salpingectomy and partial left oophorectomy  OR Table Position: Supine  Length of Time: 145 minutes  Estimated Blood Loss: 200ml    Assessment  Lines: IV 20G right arm  Type of Dressing: Abdominal incision with Surgical glue and tape  Drains and Ostomies: None  Pain control: None in PACU  Temperature control: Warming blanket + force air  Nausea and Vomiting control: None in PACU  Others Interventions: Semi bass position and skin assessment  Kwame Score: 9    Recommendations   Diet: As ordered  Medications: As ordered  Position: Semi bass  Discharge plan: As ordered    Gustavo Vasquez RN

## 2023-03-29 NOTE — ANESTHESIA PREPROCEDURE EVALUATION
Relevant Problems   No relevant active problems       Anesthetic History   No history of anesthetic complications            Review of Systems / Medical History  Patient summary reviewed and pertinent labs reviewed    Pulmonary  Within defined limits                 Neuro/Psych         Psychiatric history     Cardiovascular  Within defined limits                     GI/Hepatic/Renal  Within defined limits              Endo/Other  Within defined limits           Other Findings            Past Medical History:   Diagnosis Date    Abnormal Papanicolaou smear of cervix 2009    carcinoma in-situ    Depression        Past Surgical History:   Procedure Laterality Date    HX LEEP PROCEDURE  2009       No current outpatient medications    Current Facility-Administered Medications   Medication Dose Route Frequency    famotidine (PF) (PEPCID) 20 mg in 0.9% sodium chloride 10 mL injection  20 mg IntraVENous Q12H    fentaNYL citrate (PF) injection 50 mcg  50 mcg IntraVENous Q1H PRN     No current outpatient medications on file.        Patient Vitals for the past 24 hrs:   Temp Pulse Resp BP SpO2   03/29/23 1025 -- -- -- 119/74 94 %   03/29/23 1010 -- -- -- 114/74 93 %   03/29/23 0955 -- -- -- (!) 149/99 --   03/29/23 0940 -- -- -- (!) 141/84 --   03/29/23 0925 -- -- -- -- 95 %   03/29/23 0920 -- -- -- -- 99 %   03/29/23 0915 -- -- -- -- 98 %   03/29/23 0910 -- -- -- -- 98 %   03/29/23 0728 -- -- -- 108/72 --   03/29/23 0727 36.7 °C (98.1 °F) (!) 105 18 -- 98 %       Lab Results   Component Value Date/Time    WBC 11.0 03/29/2023 07:55 AM    HGB 14.1 03/29/2023 07:55 AM    HCT 41.7 03/29/2023 07:55 AM    PLATELET 868 81/84/2488 07:55 AM    MCV 92.1 03/29/2023 07:55 AM     Lab Results   Component Value Date/Time    Sodium 136 03/29/2023 07:55 AM    Potassium 3.6 03/29/2023 07:55 AM    Chloride 109 (H) 03/29/2023 07:55 AM    CO2 19 (L) 03/29/2023 07:55 AM    Anion gap 8 03/29/2023 07:55 AM    Glucose 188 (H) 03/29/2023 07:55 AM BUN 13 03/29/2023 07:55 AM    Creatinine 0.97 03/29/2023 07:55 AM    BUN/Creatinine ratio 13 03/29/2023 07:55 AM    Calcium 9.1 03/29/2023 07:55 AM     No results found for: APTT, PTP, INR, INREXT  Lab Results   Component Value Date/Time    Glucose 188 (H) 03/29/2023 07:55 AM         Physical Exam    Airway  Mallampati: II  TM Distance: 4 - 6 cm  Neck ROM: normal range of motion   Mouth opening: Normal     Cardiovascular    Rhythm: regular  Rate: normal         Dental  No notable dental hx       Pulmonary  Breath sounds clear to auscultation               Abdominal  GI exam deferred       Other Findings            Anesthetic Plan    ASA: 2, emergent  Anesthesia type: general    Monitoring Plan: Continuous noninvasive hemodynamic monitoring      Induction: Intravenous  Anesthetic plan and risks discussed with: Patient

## 2023-03-29 NOTE — BRIEF OP NOTE
Brief Postoperative Note    Patient: Radha Jefferson  YOB: 1985  MRN: [id number]    Date of Procedure: 3/29/2023     Pre-Op Diagnosis: POSS TORSION    Post-Op Diagnosis:  Ruptured left ovarian cyst- endometrioma       Procedure(s):  LAPAROSCOPY DIAGNOSTIC, LEFT SALPINGECTOMY, LEFT PARTIAL OOPHERECTOMY    Surgeon(s):  Vahe Vazquez MD    Surgical Assistant: Surg Asst-1: Willow Flores    Anesthesia: General     Estimated Blood Loss (mL): 350ccs of blood/fluid in abdomen    Complications: None    Specimens:   ID Type Source Tests Collected by Time Destination   1 : LEFT OVARY AND LEFT FALLOPIAN TUBE Preservative OTHER (use comments)  Vahe Vazquez MD 3/29/2023 1445 Pathology        Implants: * No implants in log *    Drains:   Orogastric Tube 03/29/23 (Active)       Findings: Multicystic left ovary with multiple chocolate fluid filled cysts consistent with endometriomas, normal appearing left fallopian tube but bleeding, Left adnexa adhered to bowel and lower posterior uterus, normal appearing white fallopian tube and ovary, normal uterus    Electronically Signed by Kinga Sorenson MD on 3/29/2023 at 4:14 PM

## 2023-03-29 NOTE — PERIOP NOTES
TRANSFER - OUT REPORT:    Verbal report given to Lulú Harper RN(name) on Lindy Epp  being transferred to [facility](unit) for routine post - op       Report consisted of patients Situation, Background, Assessment and   Recommendations(SBAR). Information from the following report(s) SBAR, Procedure Summary, Intake/Output, and MAR was reviewed with the receiving nurse. Opportunity for questions and clarification was provided.       Patient transported with:   Registered Nurse

## 2023-03-29 NOTE — H&P
History & Physical    Name: Lindy Reid MRN: [id number]  SSN: [id number]    YOB: 1985  Age: 45 y.o. Sex: female      Chief Complaint   Patient presents with    Abdominal Pain        Ms. Andrew Hebert is a 44 yo , LMP 2023 and ongoing, who presented to the ED with complaint of severe abdominal pain. Patient states she has been having intermittent left lower abdominal pain x1 year, not related to menses. But today, she developed sharp mid abdominal pain that radiates to the back. Associated with chest tightness. Intermittent. Worsens with talking. CT scan showed 7.7 cm left adnexal multicystic mass and due to patient's complaint of pelvic pain, concern for possible torsion. Gynecology was thus consulted. Patient's mother in room contributing to HPI. Pelvic ultrasound from 2022 was wnl: Normal uterus and endometrial stripe. Left ovary contains physiologic ovarian  follicles measuring up to 2.8 cm. Nonvisualization of the right ovary. No  evidence of pelvic mass or free fluid. .     Past Medical History:   Diagnosis Date    Abnormal Papanicolaou smear of cervix 2009    carcinoma in-situ    Depression      Past Surgical History:   Procedure Laterality Date    HX LEEP PROCEDURE  2009     Social History     Occupational History    Not on file   Tobacco Use    Smoking status: Never    Smokeless tobacco: Never   Vaping Use    Vaping Use: Never used   Substance and Sexual Activity    Alcohol use: Yes    Drug use: Never    Sexual activity: Yes     Family History   Problem Relation Age of Onset    Hypertension Mother        Allergies   Allergen Reactions    Tylenol [Acetaminophen] Unknown (comments)     Prior to Admission medications    Not on File        Review of Systems   Constitutional:  Negative for chills and fever. Eyes:  Negative for pain. Respiratory:  Negative for cough and shortness of breath. Cardiovascular:  Negative for chest pain.    Gastrointestinal:  Positive for diarrhea and nausea. Negative for vomiting. Genitourinary:  Negative for dysuria. Musculoskeletal:  Negative for myalgias. Neurological:  Negative for dizziness and headaches. Hematological:  Does not bruise/bleed easily. Psychiatric/Behavioral:  Negative for behavioral problems. Objective:     Vitals:  Vitals:    03/29/23 0940 03/29/23 0955 03/29/23 1010 03/29/23 1025   BP: (!) 141/84 (!) 149/99 114/74 119/74   Pulse:       Resp:       Temp:       SpO2:   93% 94%   Weight:       Height:            Physical Exam  Exam conducted with a chaperone present. Constitutional:       General: She is in acute distress (grunting in pain intermittently). Appearance: She is normal weight. She is not ill-appearing. HENT:      Head: Normocephalic and atraumatic. Eyes:      Extraocular Movements: Extraocular movements intact. Pulmonary:      Effort: Pulmonary effort is normal.   Abdominal:      Comments: Soft in lower abdomen, mild distension upper abdomen, tympanic,    Genitourinary:     Comments: Bimanual: patient with tenderness with cervical or uterine manipulation, limited exam due to discomfort per adnexa, small amt of menstrual blood noted on gloved finger  Musculoskeletal:         General: Normal range of motion. Cervical back: Normal range of motion. Skin:     General: Skin is warm and dry. Neurological:      General: No focal deficit present. Mental Status: She is alert and oriented to person, place, and time.    Psychiatric:         Mood and Affect: Mood normal.           Recent Results (from the past 24 hour(s))   METABOLIC PANEL, COMPREHENSIVE    Collection Time: 03/29/23  7:55 AM   Result Value Ref Range    Sodium 136 136 - 145 mmol/L    Potassium 3.6 3.5 - 5.1 mmol/L    Chloride 109 (H) 97 - 108 mmol/L    CO2 19 (L) 21 - 32 mmol/L    Anion gap 8 5 - 15 mmol/L    Glucose 188 (H) 65 - 100 mg/dL    BUN 13 6 - 20 mg/dL    Creatinine 0.97 0.55 - 1.02 mg/dL    BUN/Creatinine ratio 13 12 - 20      eGFR >60 >60 ml/min/1.73m2    Calcium 9.1 8.5 - 10.1 mg/dL    Bilirubin, total 0.5 0.2 - 1.0 mg/dL    AST (SGOT) 20 15 - 37 U/L    ALT (SGPT) 28 12 - 78 U/L    Alk.  phosphatase 102 45 - 117 U/L    Protein, total 7.7 6.4 - 8.2 g/dL    Albumin 3.5 3.5 - 5.0 g/dL    Globulin 4.2 (H) 2.0 - 4.0 g/dL    A-G Ratio 0.8 (L) 1.1 - 2.2     HCG QL SERUM    Collection Time: 03/29/23  7:55 AM   Result Value Ref Range    HCG, Ql. Negative Negative     LIPASE    Collection Time: 03/29/23  7:55 AM   Result Value Ref Range    Lipase 62 (L) 73 - 393 U/L   CBC W/O DIFF    Collection Time: 03/29/23  7:55 AM   Result Value Ref Range    WBC 11.0 3.6 - 11.0 K/uL    RBC 4.53 3.80 - 5.20 M/uL    HGB 14.1 11.5 - 16.0 g/dL    HCT 41.7 35.0 - 47.0 %    MCV 92.1 80.0 - 99.0 FL    MCH 31.1 26.0 - 34.0 PG    MCHC 33.8 30.0 - 36.5 g/dL    RDW 12.2 11.5 - 14.5 %    PLATELET 332 787 - 660 K/uL    MPV 9.8 8.9 - 12.9 FL    NRBC 0.0 0.0  WBC    ABSOLUTE NRBC 0.00 0.00 - 0.01 K/uL   D DIMER    Collection Time: 03/29/23  7:55 AM   Result Value Ref Range    D DIMER 0.82 (H) <0.50 ug/ml(FEU)   URINALYSIS W/ RFLX MICROSCOPIC    Collection Time: 03/29/23  9:57 AM   Result Value Ref Range    Color Yellow/Straw      Appearance Turbid (A) Clear      Specific gravity >1.030 (H) 1.003 - 1.030    pH (UA) 5.0 5.0 - 8.0      Protein Negative Negative mg/dL    Glucose Negative Negative mg/dL    Ketone Negative Negative mg/dL    Bilirubin Negative Negative      Blood Moderate (A) Negative      Urobilinogen 0.1 0.1 - 1.0 EU/dL    Nitrites Negative Negative      Leukocyte Esterase Small (A) Negative     HCG URINE, QL    Collection Time: 03/29/23  9:57 AM   Result Value Ref Range    HCG urine, QL Negative Negative     URINE MICROSCOPIC    Collection Time: 03/29/23  9:57 AM   Result Value Ref Range    WBC 10-20 0 - 4 /hpf    RBC 0-5 0 - 5 /hpf    Epithelial cells Many (A) Few /lpf    Bacteria Negative Negative /hpf    Mucus 4+ (A) Negative /lpf Imaging:  EXAM:  CTA CHEST W OR W WO CONT, CT ABD PELV W CONT 3/29/23     INDICATION: cp keren dd     COMPARISON: Chest radiograph 3/29/2023. CONTRAST:  100 mL of Isovue-370. TECHNIQUE:   Following the uneventful intravenous administration of contrast, thin axial  images were obtained through the chest, abdomen and pelvis. Coronal and sagittal  reconstructions were generated. MIP reconstructions of the chest were generated. Oral contrast was not administered. CT dose reduction was achieved through use  of a standardized protocol tailored for this examination and automatic exposure  control for dose modulation. FINDINGS:   Chest:  Vascular:  No evidence of acute or chronic pulmonary embolism. The pulmonary arteries are  normal in size. The thoracic aorta is normal in size. Lungs/Pleura: No evidence of consolidation, pleural effusion, or pneumothorax. There is mild dependent subsegmental atelectasis in the right lower lobe. No  suspicious pulmonary nodules. Axilla/Soft Tissue: No pathologic axillary adenopathy. There is a 1.4 cm mass in  the posterior left breast (series 401 image 54). Mediastinum: The heart is normal in size. No pericardial effusion. Coronary  artery calcium: Absent. No pathologic mediastinal or hilar adenopathy. Bones: No evidence of acute fracture, dislocation, or aggressive osseous  abnormality. Abdomen/Pelvis:  Liver:  No focal liver lesions. Biliary system: Gallbladder is unremarkable. No intrahepatic or extrahepatic  biliary ductal dilatation. Spleen: Normal.     Pancreas: Normal.     Kidneys/Ureters/Bladder: No renal masses. No renal or ureteral calculi. No  hydronephrosis or hydroureter. The bladder is normal.     Adrenals: Normal.     Stomach/bowel: No dilation or abnormal wall thickening is present. No free  intraperitoneal air noted. Normal appendix. Reproductive Organs:  There is large, multicystic lesion in the left adnexa  measuring 7.7 x 5.1 x 4.7 cm (series 501 image 126 and series 502 image 52). There is surrounding fat stranding, and a small volume of free fluid in the  pelvis, and extending superiorly within the bilateral retroperitoneum. The right  ovary is unremarkable. The uterus is unremarkable. Vasculature: Normal caliber arteries. Portal vein, SMV, and splenic vein are  patent. Nodes: No pathologically enlarged lymph nodes. Fluid: Small volume free fluid in the retroperitoneum and pelvis. Bones/Soft Tissue: Indeterminant subcentimeter sclerotic lesions noted in the  left superior pubic ramus (series 502 image 50) and right sacrum (series 502  image 91). No evidence of acute fracture. IMPRESSION  Chest:  1. No evidence of pulmonary embolism. No evidence of acute process in the chest.  2. A 1.4 cm mass in the left posterior breast. Dedicated breast ultrasound is  recommended for further evaluation. Abdomen/pelvis:  1. Large multicystic lesion in the left adnexa measuring 7.7 x 5.1 x 4.7 cm,  with surrounding fat stranding and small volume free fluid within the pelvis and  retroperitoneum. In the context of acute pelvic pain, this may represent left  ovarian torsion, and possibly with an underlying malignant left ovarian mass  given appearance. Pelvic ultrasound is recommended for further evaluation. 2. Indeterminate subcentimeter sclerotic lesions in the right sacrum and left  superior pubic ramus. Recommend nuclear medicine bone scan for further  evaluation.  ------------------------------------  US Pelvis 3/29/23  CLINICAL HISTORY: Lower abdominal pain     Comparison abdomen CT 3/29/2023     Pelvic ultrasound: Realtime sonographic imaging of the pelvis was performed  transabdominally. The uterus measures 8.8 x 4.2 x 4.8 cm and is grossly normal  in appearance. The endometrial stripe and right ovary are not visualized due to  bowel gas and incomplete bladder distention. The left ovary measures 4 x 7.8 x  4.9 cm.  It is multicystic in appearance. No free fluid. IMPRESSION  Multicystic appearance of the left ovary. Nonvisualization of the  right ovary and endometrium. Correlation with transvaginal ultrasound will be  performed. Transvaginal ultrasound: Realtime sonographic imaging of the pelvis was  performed transvaginally. There is a 7 mm fibroid in the anterior uterine body. The endometrial stripe measures 4 mm. The right ovary measures 4.1 x 2.1 x 2.3  cm and the left ovary measures 7.5 x 7.3 x 3.1 cm. The left ovary is multicystic  in appearance. The right ovary is normal in appearance. And blood flow is  documented within both ovaries. Trace free fluid is noted. IMPRESSION: Enlarged multicystic left ovary as seen on prior CT. Blood flow is  documented within both ovaries. Follow-up is recommended in 6 weeks to evaluate  for any interval change. Impression/Plan:     Principal Problem:    Generalized abdominal pain (3/29/2023)    Active Problems:    Cyst of left ovary (3/29/2023)         Plan:   Due to severe abdominal pain, and large left ovarian cyst, discussed proceeding with diagnostic laparoscopy. Discussed possibility of torsion, ovarian cyst rupture, vs non gyn etiology. Discussed CT scan mentioned possibility of malignancy of the left ovarian mass. Discussed should intraoperative findings not show torsion and there is visible concern for malignancy, we would obtain peritoneal fluid for tumor markers and refer her to gyn onc. Consented for diagnostic laparoscopy, possible removal of right and or left ovarian cyst, oophorectomy, or salpingectomy. Reviewed risks of surgery to include but not be limited to risk of injury to surrounding organs including bowel, bladder, nerves, bleeding, need for transfusion and its associated risks, infection, DVT, reoperation, prolonged hospital stay. Understands risk of laparotomy. Patient understands and desires to proceed.

## 2023-03-29 NOTE — ANESTHESIA POSTPROCEDURE EVALUATION
Procedure(s):  LAPAROSCOPY DIAGNOSTIC LEFT SALPINGECTOMY, LEFT PARTIAL OOPHERECTOMY. general    Anesthesia Post Evaluation      Multimodal analgesia: multimodal analgesia used between 6 hours prior to anesthesia start to PACU discharge  Patient location during evaluation: PACU  Patient participation: complete - patient participated  Level of consciousness: awake  Pain score: 3  Pain management: adequate  Airway patency: patent  Anesthetic complications: no  Cardiovascular status: acceptable  Respiratory status: acceptable  Hydration status: acceptable  Post anesthesia nausea and vomiting:  controlled  Final Post Anesthesia Temperature Assessment:  Normothermia (36.0-37.5 degrees C)      INITIAL Post-op Vital signs:   Vitals Value Taken Time   /87 03/29/23 1550   Temp 37.2 °C (99 °F) 03/29/23 1535   Pulse 97 03/29/23 1552   Resp 14 03/29/23 1552   SpO2 92 % 03/29/23 1552   Vitals shown include unvalidated device data.

## 2023-03-29 NOTE — ED PROVIDER NOTES
EMERGENCY DEPARTMENT HISTORY AND PHYSICAL EXAM      Date: 3/29/2023  Patient Name: West Navarro    History of Presenting Illness     Chief Complaint   Patient presents with    Abdominal Pain       History Provided By: Patient    HPI: West Navarro, 45 y.o. female with a past medical history significant No significant past medical history presents to the ED with chief complaint of Abdominal Pain  . 75-year-old female sudden onset of severe diffuse abdominal pain with pleuritic chest pain. Nauseated no vomiting diarrhea no constipation. today pain              There are no other complaints, changes, or physical findings at this time. PCP: Moni Mcintyre MD    Current Facility-Administered Medications   Medication Dose Route Frequency Provider Last Rate Last Admin    famotidine (PF) (PEPCID) 20 mg in 0.9% sodium chloride 10 mL injection  20 mg IntraVENous Q12H Niya Flynn MD   20 mg at 03/29/23 1968    fentaNYL citrate (PF) injection 50 mcg  50 mcg IntraVENous Q1H PRN Trisha Hernandez MD           Past History     Past Medical History:  Past Medical History:   Diagnosis Date    Abnormal Papanicolaou smear of cervix 2009    carcinoma in-situ    Depression        Past Surgical History:  Past Surgical History:   Procedure Laterality Date    HX LEEP PROCEDURE  2009       Family History:  Family History   Problem Relation Age of Onset    Hypertension Mother        Social History:  Social History     Tobacco Use    Smoking status: Never    Smokeless tobacco: Never   Vaping Use    Vaping Use: Never used   Substance Use Topics    Alcohol use: Yes    Drug use: Never       Allergies: Allergies   Allergen Reactions    Tylenol [Acetaminophen] Unknown (comments)         Review of Systems   Review of Systems   Constitutional: Negative. Negative for chills, fatigue and fever. HENT: Negative. Negative for congestion, nosebleeds and sore throat. Eyes: Negative.   Negative for pain, discharge and visual disturbance. Respiratory:  Positive for chest tightness. Negative for cough and shortness of breath. Cardiovascular:  Negative for chest pain, palpitations and leg swelling. Gastrointestinal:  Positive for abdominal pain. Negative for blood in stool, constipation, diarrhea, nausea and vomiting. Endocrine: Negative. Genitourinary:  Positive for pelvic pain. Negative for difficulty urinating, dysuria and vaginal bleeding. Musculoskeletal: Negative. Negative for arthralgias, back pain and myalgias. Skin: Negative. Negative for rash and wound. Allergic/Immunologic: Negative. Neurological: Negative. Negative for dizziness, syncope, weakness, numbness and headaches. Hematological: Negative. Psychiatric/Behavioral: Negative. Negative for agitation, confusion and suicidal ideas. All other systems reviewed and are negative. Positives and Pertinent negatives as per HPI. Physical Exam   Patient Vitals for the past 24 hrs:   Temp Pulse Resp BP SpO2   03/29/23 1025 -- -- -- 119/74 94 %   03/29/23 1010 -- -- -- 114/74 93 %   03/29/23 0955 -- -- -- (!) 149/99 --   03/29/23 0940 -- -- -- (!) 141/84 --   03/29/23 0925 -- -- -- -- 95 %   03/29/23 0920 -- -- -- -- 99 %   03/29/23 0915 -- -- -- -- 98 %   03/29/23 0910 -- -- -- -- 98 %   03/29/23 0728 -- -- -- 108/72 --   03/29/23 0727 98.1 °F (36.7 °C) (!) 105 18 -- 98 %         Physical Exam  Vitals and nursing note reviewed. Exam conducted with a chaperone present. Constitutional:       General: She is in acute distress. Appearance: Normal appearance. She is normal weight. HENT:      Head: Normocephalic and atraumatic. Nose: Nose normal.      Mouth/Throat:      Mouth: Mucous membranes are moist.      Pharynx: Oropharynx is clear. Eyes:      Extraocular Movements: Extraocular movements intact. Conjunctiva/sclera: Conjunctivae normal.      Pupils: Pupils are equal, round, and reactive to light. Cardiovascular:      Rate and Rhythm: Normal rate and regular rhythm. Pulses: Normal pulses. Heart sounds: Normal heart sounds. Pulmonary:      Effort: Pulmonary effort is normal. No respiratory distress. Breath sounds: Normal breath sounds. Abdominal:      General: Abdomen is flat. Bowel sounds are normal. There is no distension. Palpations: Abdomen is soft. Tenderness: There is generalized abdominal tenderness. There is guarding and rebound. Musculoskeletal:         General: No swelling, tenderness, deformity or signs of injury. Normal range of motion. Cervical back: Normal range of motion and neck supple. Right lower leg: No edema. Left lower leg: No edema. Skin:     General: Skin is warm and dry. Capillary Refill: Capillary refill takes less than 2 seconds. Findings: No lesion or rash. Neurological:      General: No focal deficit present. Mental Status: She is alert and oriented to person, place, and time. Mental status is at baseline. Cranial Nerves: No cranial nerve deficit. Psychiatric:         Mood and Affect: Mood normal.         Behavior: Behavior normal.         Thought Content: Thought content normal.         Judgment: Judgment normal.       Diagnostic Study Results     LABS:   Recent Results (from the past 12 hour(s))   METABOLIC PANEL, COMPREHENSIVE    Collection Time: 03/29/23  7:55 AM   Result Value Ref Range    Sodium 136 136 - 145 mmol/L    Potassium 3.6 3.5 - 5.1 mmol/L    Chloride 109 (H) 97 - 108 mmol/L    CO2 19 (L) 21 - 32 mmol/L    Anion gap 8 5 - 15 mmol/L    Glucose 188 (H) 65 - 100 mg/dL    BUN 13 6 - 20 mg/dL    Creatinine 0.97 0.55 - 1.02 mg/dL    BUN/Creatinine ratio 13 12 - 20      eGFR >60 >60 ml/min/1.73m2    Calcium 9.1 8.5 - 10.1 mg/dL    Bilirubin, total 0.5 0.2 - 1.0 mg/dL    AST (SGOT) 20 15 - 37 U/L    ALT (SGPT) 28 12 - 78 U/L    Alk.  phosphatase 102 45 - 117 U/L    Protein, total 7.7 6.4 - 8.2 g/dL Albumin 3.5 3.5 - 5.0 g/dL    Globulin 4.2 (H) 2.0 - 4.0 g/dL    A-G Ratio 0.8 (L) 1.1 - 2.2     HCG QL SERUM    Collection Time: 03/29/23  7:55 AM   Result Value Ref Range    HCG, Ql. Negative Negative     LIPASE    Collection Time: 03/29/23  7:55 AM   Result Value Ref Range    Lipase 62 (L) 73 - 393 U/L   CBC W/O DIFF    Collection Time: 03/29/23  7:55 AM   Result Value Ref Range    WBC 11.0 3.6 - 11.0 K/uL    RBC 4.53 3.80 - 5.20 M/uL    HGB 14.1 11.5 - 16.0 g/dL    HCT 41.7 35.0 - 47.0 %    MCV 92.1 80.0 - 99.0 FL    MCH 31.1 26.0 - 34.0 PG    MCHC 33.8 30.0 - 36.5 g/dL    RDW 12.2 11.5 - 14.5 %    PLATELET 995 179 - 100 K/uL    MPV 9.8 8.9 - 12.9 FL    NRBC 0.0 0.0  WBC    ABSOLUTE NRBC 0.00 0.00 - 0.01 K/uL   D DIMER    Collection Time: 03/29/23  7:55 AM   Result Value Ref Range    D DIMER 0.82 (H) <0.50 ug/ml(FEU)   URINALYSIS W/ RFLX MICROSCOPIC    Collection Time: 03/29/23  9:57 AM   Result Value Ref Range    Color Yellow/Straw      Appearance Turbid (A) Clear      Specific gravity >1.030 (H) 1.003 - 1.030    pH (UA) 5.0 5.0 - 8.0      Protein Negative Negative mg/dL    Glucose Negative Negative mg/dL    Ketone Negative Negative mg/dL    Bilirubin Negative Negative      Blood Moderate (A) Negative      Urobilinogen 0.1 0.1 - 1.0 EU/dL    Nitrites Negative Negative      Leukocyte Esterase Small (A) Negative     HCG URINE, QL    Collection Time: 03/29/23  9:57 AM   Result Value Ref Range    HCG urine, QL Negative Negative     URINE MICROSCOPIC    Collection Time: 03/29/23  9:57 AM   Result Value Ref Range    WBC 10-20 0 - 4 /hpf    RBC 0-5 0 - 5 /hpf    Epithelial cells Many (A) Few /lpf    Bacteria Negative Negative /hpf    Mucus 4+ (A) Negative /lpf        EKG: EKG interpreted independently by ER physician.      RADIOLOGY:  Non-plain film images such as CT, Ultrasound and MRI are read by the radiologist. Plain radiographic images are visualized and preliminarily interpreted by the ED Provider with the below findings:          Interpretation per the Radiologist below, if available at the time of this note:     CTA CHEST W OR W WO CONT    Result Date: 3/29/2023  EXAM:  CTA CHEST W OR W WO CONT, CT ABD PELV W CONT INDICATION: cp keren dd COMPARISON: Chest radiograph 3/29/2023. CONTRAST:  100 mL of Isovue-370. TECHNIQUE: Following the uneventful intravenous administration of contrast, thin axial images were obtained through the chest, abdomen and pelvis. Coronal and sagittal reconstructions were generated. MIP reconstructions of the chest were generated. Oral contrast was not administered. CT dose reduction was achieved through use of a standardized protocol tailored for this examination and automatic exposure control for dose modulation. FINDINGS: Chest: Vascular: No evidence of acute or chronic pulmonary embolism. The pulmonary arteries are normal in size. The thoracic aorta is normal in size. Lungs/Pleura: No evidence of consolidation, pleural effusion, or pneumothorax. There is mild dependent subsegmental atelectasis in the right lower lobe. No suspicious pulmonary nodules. Axilla/Soft Tissue: No pathologic axillary adenopathy. There is a 1.4 cm mass in the posterior left breast (series 401 image 54). Mediastinum: The heart is normal in size. No pericardial effusion. Coronary artery calcium: Absent. No pathologic mediastinal or hilar adenopathy. Bones: No evidence of acute fracture, dislocation, or aggressive osseous abnormality. Abdomen/Pelvis: Liver:  No focal liver lesions. Biliary system: Gallbladder is unremarkable. No intrahepatic or extrahepatic biliary ductal dilatation. Spleen: Normal. Pancreas: Normal. Kidneys/Ureters/Bladder: No renal masses. No renal or ureteral calculi. No hydronephrosis or hydroureter. The bladder is normal. Adrenals: Normal. Stomach/bowel: No dilation or abnormal wall thickening is present. No free intraperitoneal air noted. Normal appendix. Reproductive Organs:  There is large, multicystic lesion in the left adnexa measuring 7.7 x 5.1 x 4.7 cm (series 501 image 126 and series 502 image 52). There is surrounding fat stranding, and a small volume of free fluid in the pelvis, and extending superiorly within the bilateral retroperitoneum. The right ovary is unremarkable. The uterus is unremarkable. Vasculature: Normal caliber arteries. Portal vein, SMV, and splenic vein are patent. Nodes: No pathologically enlarged lymph nodes. Fluid: Small volume free fluid in the retroperitoneum and pelvis. Bones/Soft Tissue: Indeterminant subcentimeter sclerotic lesions noted in the left superior pubic ramus (series 502 image 50) and right sacrum (series 502 image 91). No evidence of acute fracture. Chest: 1. No evidence of pulmonary embolism. No evidence of acute process in the chest. 2. A 1.4 cm mass in the left posterior breast. Dedicated breast ultrasound is recommended for further evaluation. Abdomen/pelvis: 1. Large multicystic lesion in the left adnexa measuring 7.7 x 5.1 x 4.7 cm, with surrounding fat stranding and small volume free fluid within the pelvis and retroperitoneum. In the context of acute pelvic pain, this may represent left ovarian torsion, and possibly with an underlying malignant left ovarian mass given appearance. Pelvic ultrasound is recommended for further evaluation. 2. Indeterminate subcentimeter sclerotic lesions in the right sacrum and left superior pubic ramus. Recommend nuclear medicine bone scan for further evaluation. CT ABD PELV W CONT    Result Date: 3/29/2023  EXAM:  CTA CHEST W OR W WO CONT, CT ABD PELV W CONT INDICATION: cp keren dd COMPARISON: Chest radiograph 3/29/2023. CONTRAST:  100 mL of Isovue-370. TECHNIQUE: Following the uneventful intravenous administration of contrast, thin axial images were obtained through the chest, abdomen and pelvis. Coronal and sagittal reconstructions were generated. MIP reconstructions of the chest were generated.  Oral contrast was not administered. CT dose reduction was achieved through use of a standardized protocol tailored for this examination and automatic exposure control for dose modulation. FINDINGS: Chest: Vascular: No evidence of acute or chronic pulmonary embolism. The pulmonary arteries are normal in size. The thoracic aorta is normal in size. Lungs/Pleura: No evidence of consolidation, pleural effusion, or pneumothorax. There is mild dependent subsegmental atelectasis in the right lower lobe. No suspicious pulmonary nodules. Axilla/Soft Tissue: No pathologic axillary adenopathy. There is a 1.4 cm mass in the posterior left breast (series 401 image 54). Mediastinum: The heart is normal in size. No pericardial effusion. Coronary artery calcium: Absent. No pathologic mediastinal or hilar adenopathy. Bones: No evidence of acute fracture, dislocation, or aggressive osseous abnormality. Abdomen/Pelvis: Liver:  No focal liver lesions. Biliary system: Gallbladder is unremarkable. No intrahepatic or extrahepatic biliary ductal dilatation. Spleen: Normal. Pancreas: Normal. Kidneys/Ureters/Bladder: No renal masses. No renal or ureteral calculi. No hydronephrosis or hydroureter. The bladder is normal. Adrenals: Normal. Stomach/bowel: No dilation or abnormal wall thickening is present. No free intraperitoneal air noted. Normal appendix. Reproductive Organs: There is large, multicystic lesion in the left adnexa measuring 7.7 x 5.1 x 4.7 cm (series 501 image 126 and series 502 image 52). There is surrounding fat stranding, and a small volume of free fluid in the pelvis, and extending superiorly within the bilateral retroperitoneum. The right ovary is unremarkable. The uterus is unremarkable. Vasculature: Normal caliber arteries. Portal vein, SMV, and splenic vein are patent. Nodes: No pathologically enlarged lymph nodes. Fluid: Small volume free fluid in the retroperitoneum and pelvis.  Bones/Soft Tissue: Indeterminant subcentimeter sclerotic lesions noted in the left superior pubic ramus (series 502 image 50) and right sacrum (series 502 image 91). No evidence of acute fracture. Chest: 1. No evidence of pulmonary embolism. No evidence of acute process in the chest. 2. A 1.4 cm mass in the left posterior breast. Dedicated breast ultrasound is recommended for further evaluation. Abdomen/pelvis: 1. Large multicystic lesion in the left adnexa measuring 7.7 x 5.1 x 4.7 cm, with surrounding fat stranding and small volume free fluid within the pelvis and retroperitoneum. In the context of acute pelvic pain, this may represent left ovarian torsion, and possibly with an underlying malignant left ovarian mass given appearance. Pelvic ultrasound is recommended for further evaluation. 2. Indeterminate subcentimeter sclerotic lesions in the right sacrum and left superior pubic ramus. Recommend nuclear medicine bone scan for further evaluation. US TRANSVAGINAL    Result Date: 3/29/2023  CLINICAL HISTORY: Lower abdominal pain Comparison abdomen CT 3/29/2023 Pelvic ultrasound: Realtime sonographic imaging of the pelvis was performed transabdominally. The uterus measures 8.8 x 4.2 x 4.8 cm and is grossly normal in appearance. The endometrial stripe and right ovary are not visualized due to bowel gas and incomplete bladder distention. The left ovary measures 4 x 7.8 x 4.9 cm. It is multicystic in appearance. No free fluid. Multicystic appearance of the left ovary. Nonvisualization of the right ovary and endometrium. Correlation with transvaginal ultrasound will be performed. Transvaginal ultrasound: Realtime sonographic imaging of the pelvis was performed transvaginally. There is a 7 mm fibroid in the anterior uterine body. The endometrial stripe measures 4 mm. The right ovary measures 4.1 x 2.1 x 2.3 cm and the left ovary measures 7.5 x 7.3 x 3.1 cm. The left ovary is multicystic in appearance. The right ovary is normal in appearance.  And blood flow is documented within both ovaries. Trace free fluid is noted. IMPRESSION: Enlarged multicystic left ovary as seen on prior CT. Blood flow is documented within both ovaries. Follow-up is recommended in 6 weeks to evaluate for any interval change. US PELV NON OBS    Result Date: 3/29/2023  CLINICAL HISTORY: Lower abdominal pain Comparison abdomen CT 3/29/2023 Pelvic ultrasound: Realtime sonographic imaging of the pelvis was performed transabdominally. The uterus measures 8.8 x 4.2 x 4.8 cm and is grossly normal in appearance. The endometrial stripe and right ovary are not visualized due to bowel gas and incomplete bladder distention. The left ovary measures 4 x 7.8 x 4.9 cm. It is multicystic in appearance. No free fluid. Multicystic appearance of the left ovary. Nonvisualization of the right ovary and endometrium. Correlation with transvaginal ultrasound will be performed. Transvaginal ultrasound: Realtime sonographic imaging of the pelvis was performed transvaginally. There is a 7 mm fibroid in the anterior uterine body. The endometrial stripe measures 4 mm. The right ovary measures 4.1 x 2.1 x 2.3 cm and the left ovary measures 7.5 x 7.3 x 3.1 cm. The left ovary is multicystic in appearance. The right ovary is normal in appearance. And blood flow is documented within both ovaries. Trace free fluid is noted. IMPRESSION: Enlarged multicystic left ovary as seen on prior CT. Blood flow is documented within both ovaries. Follow-up is recommended in 6 weeks to evaluate for any interval change. XR CHEST PORT    Result Date: 3/29/2023  EXAM:  XR CHEST PORT INDICATION: Shortness of breath COMPARISON: none TECHNIQUE: portable chest AP view FINDINGS: The cardiac silhouette is within normal limits. The pulmonary vasculature is within normal limits. The lungs and pleural spaces are clear. The visualized bones and upper abdomen are age-appropriate.      No acute process on portable chest.     CT Results  (Last 48 hours) 03/29/23 9839  CTA [facility] CONT Final result    Impression:  Chest:   1. No evidence of pulmonary embolism. No evidence of acute process in the chest.   2. A 1.4 cm mass in the left posterior breast. Dedicated breast ultrasound is   recommended for further evaluation. Abdomen/pelvis:   1. Large multicystic lesion in the left adnexa measuring 7.7 x 5.1 x 4.7 cm,   with surrounding fat stranding and small volume free fluid within the pelvis and   retroperitoneum. In the context of acute pelvic pain, this may represent left   ovarian torsion, and possibly with an underlying malignant left ovarian mass   given appearance. Pelvic ultrasound is recommended for further evaluation. 2. Indeterminate subcentimeter sclerotic lesions in the right sacrum and left   superior pubic ramus. Recommend nuclear medicine bone scan for further   evaluation. Narrative:  EXAM:  CTA CHEST W OR W WO CONT, CT ABD PELV W CONT       INDICATION: cp keren dd       COMPARISON: Chest radiograph 3/29/2023. CONTRAST:  100 mL of Isovue-370. TECHNIQUE:    Following the uneventful intravenous administration of contrast, thin axial   images were obtained through the chest, abdomen and pelvis. Coronal and sagittal   reconstructions were generated. MIP reconstructions of the chest were generated. Oral contrast was not administered. CT dose reduction was achieved through use   of a standardized protocol tailored for this examination and automatic exposure   control for dose modulation. FINDINGS:    Chest:   Vascular:   No evidence of acute or chronic pulmonary embolism. The pulmonary arteries are   normal in size. The thoracic aorta is normal in size. Lungs/Pleura: No evidence of consolidation, pleural effusion, or pneumothorax. There is mild dependent subsegmental atelectasis in the right lower lobe. No   suspicious pulmonary nodules.        Axilla/Soft Tissue: No pathologic axillary adenopathy. There is a 1.4 cm mass in   the posterior left breast (series 401 image 54). Mediastinum: The heart is normal in size. No pericardial effusion. Coronary   artery calcium: Absent. No pathologic mediastinal or hilar adenopathy. Bones: No evidence of acute fracture, dislocation, or aggressive osseous   abnormality. Abdomen/Pelvis:   Liver:  No focal liver lesions. Biliary system: Gallbladder is unremarkable. No intrahepatic or extrahepatic   biliary ductal dilatation. Spleen: Normal.       Pancreas: Normal.       Kidneys/Ureters/Bladder: No renal masses. No renal or ureteral calculi. No   hydronephrosis or hydroureter. The bladder is normal.       Adrenals: Normal.       Stomach/bowel: No dilation or abnormal wall thickening is present. No free   intraperitoneal air noted. Normal appendix. Reproductive Organs: There is large, multicystic lesion in the left adnexa   measuring 7.7 x 5.1 x 4.7 cm (series 501 image 126 and series 502 image 52). There is surrounding fat stranding, and a small volume of free fluid in the   pelvis, and extending superiorly within the bilateral retroperitoneum. The right   ovary is unremarkable. The uterus is unremarkable. Vasculature: Normal caliber arteries. Portal vein, SMV, and splenic vein are   patent. Nodes: No pathologically enlarged lymph nodes. Fluid: Small volume free fluid in the retroperitoneum and pelvis. Bones/Soft Tissue: Indeterminant subcentimeter sclerotic lesions noted in the   left superior pubic ramus (series 502 image 50) and right sacrum (series 502   image 91). No evidence of acute fracture. 03/29/23 0942  CT ABD PELV W CONT Final result    Impression:  Chest:   1. No evidence of pulmonary embolism. No evidence of acute process in the chest.   2. A 1.4 cm mass in the left posterior breast. Dedicated breast ultrasound is   recommended for further evaluation. Abdomen/pelvis:   1. Large multicystic lesion in the left adnexa measuring 7.7 x 5.1 x 4.7 cm,   with surrounding fat stranding and small volume free fluid within the pelvis and   retroperitoneum. In the context of acute pelvic pain, this may represent left   ovarian torsion, and possibly with an underlying malignant left ovarian mass   given appearance. Pelvic ultrasound is recommended for further evaluation. 2. Indeterminate subcentimeter sclerotic lesions in the right sacrum and left   superior pubic ramus. Recommend nuclear medicine bone scan for further   evaluation. Narrative:  EXAM:  CTA CHEST W OR W WO CONT, CT ABD PELV W CONT       INDICATION: cp keren dd       COMPARISON: Chest radiograph 3/29/2023. CONTRAST:  100 mL of Isovue-370. TECHNIQUE:    Following the uneventful intravenous administration of contrast, thin axial   images were obtained through the chest, abdomen and pelvis. Coronal and sagittal   reconstructions were generated. MIP reconstructions of the chest were generated. Oral contrast was not administered. CT dose reduction was achieved through use   of a standardized protocol tailored for this examination and automatic exposure   control for dose modulation. FINDINGS:    Chest:   Vascular:   No evidence of acute or chronic pulmonary embolism. The pulmonary arteries are   normal in size. The thoracic aorta is normal in size. Lungs/Pleura: No evidence of consolidation, pleural effusion, or pneumothorax. There is mild dependent subsegmental atelectasis in the right lower lobe. No   suspicious pulmonary nodules. Axilla/Soft Tissue: No pathologic axillary adenopathy. There is a 1.4 cm mass in   the posterior left breast (series 401 image 54). Mediastinum: The heart is normal in size. No pericardial effusion. Coronary   artery calcium: Absent. No pathologic mediastinal or hilar adenopathy.        Bones: No evidence of acute fracture, dislocation, or aggressive osseous abnormality. Abdomen/Pelvis:   Liver:  No focal liver lesions. Biliary system: Gallbladder is unremarkable. No intrahepatic or extrahepatic   biliary ductal dilatation. Spleen: Normal.       Pancreas: Normal.       Kidneys/Ureters/Bladder: No renal masses. No renal or ureteral calculi. No   hydronephrosis or hydroureter. The bladder is normal.       Adrenals: Normal.       Stomach/bowel: No dilation or abnormal wall thickening is present. No free   intraperitoneal air noted. Normal appendix. Reproductive Organs: There is large, multicystic lesion in the left adnexa   measuring 7.7 x 5.1 x 4.7 cm (series 501 image 126 and series 502 image 52). There is surrounding fat stranding, and a small volume of free fluid in the   pelvis, and extending superiorly within the bilateral retroperitoneum. The right   ovary is unremarkable. The uterus is unremarkable. Vasculature: Normal caliber arteries. Portal vein, SMV, and splenic vein are   patent. Nodes: No pathologically enlarged lymph nodes. Fluid: Small volume free fluid in the retroperitoneum and pelvis. Bones/Soft Tissue: Indeterminant subcentimeter sclerotic lesions noted in the   left superior pubic ramus (series 502 image 50) and right sacrum (series 502   image 91). No evidence of acute fracture. CXR Results  (Last 48 hours)                 03/29/23 0932  XR CHEST PORT Final result    Impression:      No acute process on portable chest.           Narrative:  EXAM:  XR CHEST PORT       INDICATION: Shortness of breath       COMPARISON: none       TECHNIQUE: portable chest AP view       FINDINGS: The cardiac silhouette is within normal limits. The pulmonary   vasculature is within normal limits. The lungs and pleural spaces are clear. The visualized bones and upper abdomen   are age-appropriate.                    Medical Decision Making and ED Course       CC/HPI Summary, DDx, ED Course, and Reassessment: 60-year-old female with diffuse abdominal pain pleuritic chest pain that started today. Differential includes obstruction versus PE versus pregnancy versus ectopic versus surgical abdomen. Plan for CAT scan CTA chest abdomen pelvis. Lab work urine and reassessment pain control with Dilaudid. CT does suggest an ovarian cyst with significant free fluid. Patient's vitals are stable. OB/GYN consulted aware ultrasound is in process. These orders were placed during the ER evaluation:  Orders Placed This Encounter    XR CHEST PORT     Standing Status:   Standing     Number of Occurrences:   1     Order Specific Question:   Reason for Exam     Answer:   sob     Order Specific Question:   Is Patient Pregnant? Answer:   No    CTA CHEST W OR W WO CONT     Standing Status:   Standing     Number of Occurrences:   1     Order Specific Question:   Transport     Answer:   BED [2]     Order Specific Question:   Is Patient Pregnant? Answer:   No     Order Specific Question:   Reason for Exam     Answer:   cp keren dd     Order Specific Question:   Does patient have history of Renal Disease? Answer:   No     Order Specific Question:   Decision Support Exception     Answer:   Emergency Medical Condition (MA) [1]    CT ABD PELV W CONT     Standing Status:   Standing     Number of Occurrences:   1     Order Specific Question:   Transport     Answer:   BED [2]     Order Specific Question:   Is Patient Pregnant? Answer:   No     Order Specific Question:   Reason for Exam     Answer:   pain     Order Specific Question: This order utilizes IV contrast.  What additional contrast is needed? Answer:   None     Order Specific Question:   Does patient have history of Renal Disease?      Answer:   No     Order Specific Question:   Decision Support Exception     Answer:   Emergency Medical Condition (MA) [1]    US TRANSVAGINAL     Standing Status:   Standing     Number of Occurrences:   1     Order Specific Question:   Transport     Answer:   BED [2]     Order Specific Question:   Reason for Exam     Answer:   torsion ? ? Order Specific Question:   Is Patient Pregnant? Answer:   No    US PELV NON OBS     Standing Status:   Standing     Number of Occurrences:   1     Order Specific Question:   Transport     Answer:   BED [2]     Order Specific Question:   Reason for Exam     Answer:   torsion? Order Specific Question:   Is Patient Pregnant? Answer:   No    METABOLIC PANEL, COMPREHENSIVE     Standing Status:   Standing     Number of Occurrences:   1    URINALYSIS W/ RFLX MICROSCOPIC     Standing Status:   Standing     Number of Occurrences:   1    HCG QL SERUM     Standing Status:   Standing     Number of Occurrences:   1    HCG URINE, QL     Standing Status:   Standing     Number of Occurrences:   1    LIPASE     Standing Status:   Standing     Number of Occurrences:   1    CBC W/O DIFF     Standing Status:   Standing     Number of Occurrences:   1    D DIMER     Standing Status:   Standing     Number of Occurrences:   1    URINE MICROSCOPIC     Standing Status:   Standing     Number of Occurrences:   1    DIET NPO     Standing Status:   Standing     Number of Occurrences:   1    VITAL SIGNS     Standing Status:   Standing     Number of Occurrences:   1    EKG, 12 LEAD, INITIAL     Standing Status:   Standing     Number of Occurrences:   1     Order Specific Question:   Reason for Exam:     Answer:   ABD Pain    SALINE LOCK IV ONE TIME STAT     Standing Status:   Standing     Number of Occurrences:   1    HYDROmorphone (DILAUDID) injection 1 mg    ondansetron (ZOFRAN) injection 4 mg    sodium chloride 0.9 % bolus infusion 1,000 mL    HYDROmorphone (DILAUDID) injection 1 mg    famotidine (PF) (PEPCID) 20 mg in 0.9% sodium chloride 10 mL injection     Order Specific Question:   H2RA INDICATION     Answer:    Other (describe in comments)    iopamidoL (ISOVUE-370) 370 mg iodine /mL (76 %) injection 100 mL    HYDROmorphone (DILAUDID) injection 1 mg    cefTRIAXone (ROCEPHIN) 1 g in sterile water (preservative free) 10 mL IV syringe     Order Specific Question:   Antibiotic Indications     Answer:   Urinary Tract Infection    fentaNYL citrate (PF) injection 50 mcg    IP CONSULT TO OB GYN     Standing Status:   Standing     Number of Occurrences:   1     Order Specific Question:   Reason for Consult: Answer:   large ovarian cyst     Order Specific Question:   Did you call or speak to the consulting provider? Answer:   Yes    INITIAL PHYSICIAN ORDER: INPATIENT Extended Recovery; 3. Patient receiving treatment that can only be provided in an inpatient setting (further clarification in H&P documentation)     Standing Status:   Standing     Number of Occurrences:   1     Order Specific Question:   Status: Answer:   INPATIENT [101]     Order Specific Question:   Type of Bed     Answer:   Extended Recovery [40]     Order Specific Question:   Inpatient Hospitalization Certified Necessary for the Following Reasons     Answer:   3.  Patient receiving treatment that can only be provided in an inpatient setting (further clarification in H&P documentation)     Order Specific Question:   Admitting Diagnosis     Answer:   Ovarian cyst [[id number]]     Order Specific Question:   Admitting Physician     Answer:   Gustabo Heredia [[id number]]     Order Specific Question:   Attending Physician     Answer:   Gustabo Heredia [[id number]]     Order Specific Question:   Estimated Length of Stay     Answer:   2 Midnights     Order Specific Question:   Discharge Plan:     Answer:   Home with Office Follow-up     Order Specific Question:   Comments     Answer:   going to OR first        Patient was given the following medications:  Medications   famotidine (PF) (PEPCID) 20 mg in 0.9% sodium chloride 10 mL injection (20 mg IntraVENous Given 3/29/23 2349)   fentaNYL citrate (PF) injection 50 mcg (has no administration in time range)   HYDROmorphone (DILAUDID) injection 1 mg (1 mg IntraVENous Given 3/29/23 0810)   ondansetron (ZOFRAN) injection 4 mg (4 mg IntraVENous Given 3/29/23 0807)   sodium chloride 0.9 % bolus infusion 1,000 mL (1,000 mL IntraVENous New Bag 3/29/23 0846)   HYDROmorphone (DILAUDID) injection 1 mg (1 mg IntraVENous Given 3/29/23 0922)   iopamidoL (ISOVUE-370) 370 mg iodine /mL (76 %) injection 100 mL (100 mL IntraVENous Given 3/29/23 0952)   HYDROmorphone (DILAUDID) injection 1 mg (1 mg IntraVENous Given 3/29/23 1151)   cefTRIAXone (ROCEPHIN) 1 g in sterile water (preservative free) 10 mL IV syringe (1 g IntraVENous Given 3/29/23 1152)           ED Course:       ED Course as of 03/29/23 1200   Wed Mar 29, 2023   0906 HCG, Ql.: Negative  Interpreted independently by ER physician as normal   [HP]   0906 D DIMER(!): 0.82  Elevated, cta ordered [HP]   0907 CO2(!): 19  low [HP]   0907 CBC W/O DIFF:    WBC 11.0   RBC 4.53   HGB 14.1   HCT 41.7   MCV 92.1   MCH 31.1   MCHC 33.8   RDW 12.2   PLATELET 033   MPV 9.8   NRBC 0.0   ABSOLUTE NRBC 0.00  Interpreted independently by ER physician as normal   [HP]   0265 CT abdomen pelvis performed for pleuritic chest pain diffuse abdominal pain which does suggest a very large ovarian cyst questionable torsion versus rupture. I did order an ultrasound of the pelvis. She is not pregnant. With the concern of torsion and actively needing multiple rounds of narcotics I did consult OB/GYN and discussed the case with Dr. Lulu Dos Santos, Who will come and evaluated patient at bedside is aware ultrasound is pending. [HP]   1135 WBC: 10-20 [HP]   1136 Epithelial cells(!): Many [HP]   5546 US: IMPRESSION: Enlarged multicystic left ovary as seen on prior CT. Blood flow is  documented within both ovaries. Follow-up is recommended in 6 weeks to evaluate  for any interval change.    [HP]   1155 Dr Angie Ramirez in ER seeing pt [HP]   1200 Plan forOR [HP]      ED Course User Index  [HP] Gale Flores MD Vital Signs-Reviewed the patient's vital signs. Patient Vitals for the past 24 hrs:   Temp Pulse Resp BP SpO2   03/29/23 1025 -- -- -- 119/74 94 %   03/29/23 1010 -- -- -- 114/74 93 %   03/29/23 0955 -- -- -- (!) 149/99 --   03/29/23 0940 -- -- -- (!) 141/84 --   03/29/23 0925 -- -- -- -- 95 %   03/29/23 0920 -- -- -- -- 99 %   03/29/23 0915 -- -- -- -- 98 %   03/29/23 0910 -- -- -- -- 98 %   03/29/23 0728 -- -- -- 108/72 --   03/29/23 0727 98.1 °F (36.7 °C) (!) 105 18 -- 98 %     Vitals interpreted independently by ER physician. leanna    Medical decision making tools:                No data recorded          Disposition Considerations (Tests not done, Shared Decision Making, Pt Expectation of Test or Tx.): Significant pain concern for ovarian cyst rupture versus intermittent torsion with a very large ovarian cyst.  Not pregnant. OB/GYN evaluated patient at bedside will take to the OR. CONSULTS: (Who and What was discussed)  IP CONSULT TO OB GYN    Chronic Conditions: no    Social Determinants affecting Dx or Tx: None    Records Reviewed (source and summary of external notes): None  Records Reviewed: Previous Hospital chart. EMS run report. I reviewed the vital signs, available nursing notes, past medical history, past surgical history, family history and social history. Initial assessment performed. The patients presenting problems have been discussed, and they are in agreement with the care plan formulated and outlined with them. I have encouraged them to ask questions as they arise throughout their visit.         Admitted      Procedures         CRITICAL CARE NOTE :  12:03 PM  Amount of Critical Care Time: 30 minutes    IMPENDING DETERIORATION -Airway, Respiratory, Cardiovascular, and CNS  ASSOCIATED RISK FACTORS - Bleeding  MANAGEMENT- Bedside Assessment and Supervision of Care  INTERPRETATION -  Xrays, CT Scan, and Blood Pressure  INTERVENTIONS - hemodynamic mngmt  CASE REVIEW - Medical Sub-Specialist  TREATMENT RESPONSE -Stable  PERFORMED BY - Self    NOTES   :  I have spent critical care time involved in lab review, consultations with specialist, family decision- making, bedside attention and documentation. This time excludes time spent in any separate billed procedures. During this entire length of time I was immediately available to the patient . Edwin Edmondson MD          Disposition       Emergency Department Disposition:  Admitted  Dr Correa Esters    Diagnosis     Clinical Impression:   1. Cyst of left ovary    Free fluid abdomen  Abd pain    Attestations:    Edwin Edmondson MD    Please note that this dictation was completed with [employer], the computer voice recognition software. Quite often unanticipated grammatical, syntax, homophones, and other interpretive errors are inadvertently transcribed by the computer software. Please disregard these errors. Please excuse any errors that have escaped final proofreading. Thank you.

## 2023-03-30 ENCOUNTER — APPOINTMENT (OUTPATIENT)
Dept: ULTRASOUND IMAGING | Age: 38
DRG: 743 | End: 2023-03-30
Attending: OBSTETRICS & GYNECOLOGY
Payer: COMMERCIAL

## 2023-03-30 LAB
BASOPHILS # BLD: 0 K/UL (ref 0–0.1)
BASOPHILS # BLD: 0.1 K/UL (ref 0–0.1)
BASOPHILS NFR BLD: 0 % (ref 0–1)
BASOPHILS NFR BLD: 0 % (ref 0–1)
DIFFERENTIAL METHOD BLD: ABNORMAL
DIFFERENTIAL METHOD BLD: ABNORMAL
EOSINOPHIL # BLD: 0 K/UL (ref 0–0.4)
EOSINOPHIL # BLD: 0 K/UL (ref 0–0.4)
EOSINOPHIL NFR BLD: 0 % (ref 0–7)
EOSINOPHIL NFR BLD: 0 % (ref 0–7)
ERYTHROCYTE [DISTWIDTH] IN BLOOD BY AUTOMATED COUNT: 12.8 % (ref 11.5–14.5)
ERYTHROCYTE [DISTWIDTH] IN BLOOD BY AUTOMATED COUNT: 12.9 % (ref 11.5–14.5)
HCT VFR BLD AUTO: 33.5 % (ref 35–47)
HCT VFR BLD AUTO: 35.5 % (ref 35–47)
HGB BLD-MCNC: 11.4 G/DL (ref 11.5–16)
HGB BLD-MCNC: 11.8 G/DL (ref 11.5–16)
IMM GRANULOCYTES # BLD AUTO: 0 K/UL (ref 0–0.04)
IMM GRANULOCYTES # BLD AUTO: 0.1 K/UL (ref 0–0.04)
IMM GRANULOCYTES NFR BLD AUTO: 0 % (ref 0–0.5)
IMM GRANULOCYTES NFR BLD AUTO: 1 % (ref 0–0.5)
LYMPHOCYTES # BLD: 0.9 K/UL (ref 0.8–3.5)
LYMPHOCYTES # BLD: 1.5 K/UL (ref 0.8–3.5)
LYMPHOCYTES NFR BLD: 11 % (ref 12–49)
LYMPHOCYTES NFR BLD: 6 % (ref 12–49)
MCH RBC QN AUTO: 31.7 PG (ref 26–34)
MCH RBC QN AUTO: 32.2 PG (ref 26–34)
MCHC RBC AUTO-ENTMCNC: 33.2 G/DL (ref 30–36.5)
MCHC RBC AUTO-ENTMCNC: 34 G/DL (ref 30–36.5)
MCV RBC AUTO: 94.6 FL (ref 80–99)
MCV RBC AUTO: 95.4 FL (ref 80–99)
MONOCYTES # BLD: 0.4 K/UL (ref 0–1)
MONOCYTES # BLD: 0.5 K/UL (ref 0–1)
MONOCYTES NFR BLD: 3 % (ref 5–13)
MONOCYTES NFR BLD: 3 % (ref 5–13)
NEUTS SEG # BLD: 11.5 K/UL (ref 1.8–8)
NEUTS SEG # BLD: 13.2 K/UL (ref 1.8–8)
NEUTS SEG NFR BLD: 86 % (ref 32–75)
NEUTS SEG NFR BLD: 90 % (ref 32–75)
NRBC # BLD: 0 K/UL (ref 0–0.01)
NRBC # BLD: 0 K/UL (ref 0–0.01)
NRBC BLD-RTO: 0 PER 100 WBC
NRBC BLD-RTO: 0 PER 100 WBC
PLATELET # BLD AUTO: 272 K/UL (ref 150–400)
PLATELET # BLD AUTO: 279 K/UL (ref 150–400)
PMV BLD AUTO: 10.4 FL (ref 8.9–12.9)
PMV BLD AUTO: 9.8 FL (ref 8.9–12.9)
RBC # BLD AUTO: 3.54 M/UL (ref 3.8–5.2)
RBC # BLD AUTO: 3.72 M/UL (ref 3.8–5.2)
WBC # BLD AUTO: 13.5 K/UL (ref 3.6–11)
WBC # BLD AUTO: 14.7 K/UL (ref 3.6–11)

## 2023-03-30 PROCEDURE — 85025 COMPLETE CBC W/AUTO DIFF WBC: CPT

## 2023-03-30 PROCEDURE — 74011250637 HC RX REV CODE- 250/637: Performed by: OBSTETRICS & GYNECOLOGY

## 2023-03-30 PROCEDURE — 74011250636 HC RX REV CODE- 250/636: Performed by: OBSTETRICS & GYNECOLOGY

## 2023-03-30 PROCEDURE — 74011000250 HC RX REV CODE- 250: Performed by: EMERGENCY MEDICINE

## 2023-03-30 PROCEDURE — 74011000250 HC RX REV CODE- 250: Performed by: OBSTETRICS & GYNECOLOGY

## 2023-03-30 PROCEDURE — 65410000002 HC RM PRIVATE OB

## 2023-03-30 PROCEDURE — 74011250636 HC RX REV CODE- 250/636: Performed by: EMERGENCY MEDICINE

## 2023-03-30 PROCEDURE — 76705 ECHO EXAM OF ABDOMEN: CPT

## 2023-03-30 PROCEDURE — 36415 COLL VENOUS BLD VENIPUNCTURE: CPT

## 2023-03-30 RX ADMIN — SODIUM CHLORIDE, PRESERVATIVE FREE 10 ML: 5 INJECTION INTRAVENOUS at 21:24

## 2023-03-30 RX ADMIN — SODIUM CHLORIDE, PRESERVATIVE FREE 20 MG: 5 INJECTION INTRAVENOUS at 21:25

## 2023-03-30 RX ADMIN — DOCUSATE SODIUM 100 MG: 100 CAPSULE, LIQUID FILLED ORAL at 09:15

## 2023-03-30 RX ADMIN — CEFAZOLIN 2 G: 1 INJECTION, POWDER, FOR SOLUTION INTRAMUSCULAR; INTRAVENOUS at 11:37

## 2023-03-30 RX ADMIN — SODIUM CHLORIDE, PRESERVATIVE FREE 20 MG: 5 INJECTION INTRAVENOUS at 09:15

## 2023-03-30 RX ADMIN — OXYCODONE HYDROCHLORIDE 10 MG: 10 TABLET ORAL at 16:59

## 2023-03-30 RX ADMIN — OXYCODONE HYDROCHLORIDE 10 MG: 10 TABLET ORAL at 04:21

## 2023-03-30 RX ADMIN — OXYCODONE HYDROCHLORIDE 10 MG: 10 TABLET ORAL at 13:06

## 2023-03-30 RX ADMIN — OXYCODONE HYDROCHLORIDE 5 MG: 5 TABLET ORAL at 09:15

## 2023-03-30 RX ADMIN — DOCUSATE SODIUM 100 MG: 100 CAPSULE, LIQUID FILLED ORAL at 21:24

## 2023-03-30 NOTE — PROGRESS NOTES
Problem: Surgical Pathway Post-Op Day 1  Goal: Activity/Safety  Outcome: Progressing Towards Goal  Goal: Diagnostic Test/Procedures  Outcome: Progressing Towards Goal  Goal: Nutrition/Diet  Outcome: Progressing Towards Goal  Goal: Discharge Planning  Outcome: Progressing Towards Goal  Goal: Medications  Outcome: Progressing Towards Goal  Goal: Respiratory  Outcome: Progressing Towards Goal  Goal: Treatments/Interventions/Procedures  Outcome: Progressing Towards Goal  Goal: Psychosocial  Outcome: Progressing Towards Goal  Goal: *No signs and symptoms of infection or wound complications  Outcome: Progressing Towards Goal  Goal: *Optimal pain control at patient's stated goal  Outcome: Progressing Towards Goal  Goal: *Adequate urinary output (equal to or greater than 30 milliliters/hour)  Outcome: Progressing Towards Goal  Goal: *Hemodynamically stable  Outcome: Progressing Towards Goal  Goal: *Tolerating diet  Outcome: Progressing Towards Goal  Goal: *Demonstrates progressive activity  Outcome: Progressing Towards Goal  Goal: *Lungs clear or at baseline  Outcome: Progressing Towards Goal     Problem: Falls - Risk of  Goal: *Absence of Falls  Description: Document Niles Fall Risk and appropriate interventions in the flowsheet.   Outcome: Progressing Towards Goal  Note: Fall Risk Interventions:            Medication Interventions: Teach patient to arise slowly                   Problem: Patient Education: Go to Patient Education Activity  Goal: Patient/Family Education  Outcome: Progressing Towards Goal

## 2023-03-30 NOTE — PROGRESS NOTES
Progress Note    Patient: Jeannette Gage MRN: [id number]  SSN: [id number]    YOB: 1985  Age: 45 y.o. Sex: female      Admit Date: 3/29/2023    LOS: 1 day     Subjective:     Patient complains of appropriate postoperative pain. Objective:     Vitals:    03/30/23 0118 03/30/23 0421 03/30/23 0730 03/30/23 0915   BP:  (!) 121/53 (!) 119/56    Pulse:  62 67    Resp:  18 18 18   Temp: 98.1 °F (36.7 °C) 99.4 °F (37.4 °C) 100 °F (37.8 °C)    SpO2:  99% 96%    Weight:       Height:            Physical Exam:   Heart is with regular rate and rhythm  Lungs are clear  Wound is clean dry and intact  Extremities without clubbing cyanosis or edema    Lab/Data Review:  Hemoglobin is 11.4    Assessment:     Principal Problem:    Generalized abdominal pain (3/29/2023)    Active Problems:    Cyst of left ovary (3/29/2023)      Ovarian cyst (3/29/2023)      S/P laparotomy (3/29/2023)      History of left salpingo-oophorectomy (3/29/2023)      Overview: Partial oophorectomy    Postoperative day 1 status post laparotomy for ruptured left endometrioma, stable.     Plan:     Routine postoperative care    Signed By: Michelle Paulson MD     March 30, 2023

## 2023-03-30 NOTE — DISCHARGE INSTRUCTIONS
Hemorrhagic Ovarian Cyst: Care Instructions  Overview     An ovarian cyst is a sac that forms on the ovary and swells up with fluid. If the cyst bleeds, it is called a hemorrhagic (say \"Jana\") ovarian cyst. If a hemorrhagic cyst breaks open, it can release blood and fluid into the lower belly and pelvis. You may not have symptoms from the cyst. But if it is large, or if it twists or breaks open, you may have pain or other problems. You may feel pain from the cyst or have symptoms from losing blood. Your doctor may use a pelvic ultrasound to see if you have a cyst. Blood tests can help your doctor tell if the cyst is bleeding or you have lost a lot of blood. Treatment depends on your symptoms. If they are mild, your doctor may suggest carefully watching your symptoms and doing blood tests again. But if you have a cyst that is very large, bleeds a lot, or causes other problems, your doctor may suggest surgery to remove it. If the bleeding is heavy, you may also need treatment to replace the blood. Follow-up care is a key part of your treatment and safety. Be sure to make and go to all appointments, and call your doctor if you are having problems. It's also a good idea to know your test results and keep a list of the medicines you take. How can you care for yourself at home? Use heat, such as a warm water bottle, a heating pad set on low, or a warm bath, to relax tense muscles and relieve cramping. Be safe with medicines. Read and follow all instructions on the label. If the doctor gave you a prescription medicine for pain, take it as prescribed. If you are not taking a prescription pain medicine, ask your doctor if you can take an over-the-counter medicine. When should you call for help? Call 911 anytime you think you may need emergency care. For example, call if:    You passed out (lost consciousness).    Call your doctor now or seek immediate medical care if:    You have severe vaginal bleeding. You are dizzy or lightheaded, or you feel like you may faint. You have new or worse pain in your belly or pelvis. Watch closely for changes in your health, and be sure to contact your doctor if:    You think you may be pregnant. You do not get better as expected. Current as of: November 22, 2021               Content Version: 13.4  © 2006-2022 [employer]. Care instructions adapted under license by [employer] (which disclaims liability or warranty for this information). If you have questions about a medical condition or this instruction, always ask your healthcare professional. [place] [address] any warranty or liability for your use of this information. Learning About Salpingectomy  What is a salpingectomy? Salpingectomy is surgery to remove the fallopian tubes. When one or part of a tube is removed, it is called a partial salpingectomy. The tubes are in the lower belly. They lead up from each upper side of the uterus. They end near the ovaries. When an egg is released by an ovary, it travels down a fallopian tube toward the uterus. The tubes may be removed at the same time as surgery to remove the uterus or ovaries. Why is it done? You may have surgery to remove your fallopian tubes for several reasons. Sterilization. The surgery blocks sperm from reaching an egg. This means you cannot get pregnant. Ectopic pregnancy. The surgery may be used to remove a fertilized egg that grows in a fallopian tube. Or it may be used if the tube may rupture or is very damaged. Cancer. If you are at risk of cancer in the ovaries, fallopian tubes, or tissues that line the inside of the belly, the surgery is used to lower that risk. It's also used to take out tubes that already have cancer. A blocked or damaged fallopian tube. Fertility problems. When a fallopian tube is blocked and fills with fluid, the fluid may leak into the uterus.  This can stop an egg from implanting. The surgery may be used to remove the source of the fluid. It may be done before in vitro fertilization (IVF). How is it done? This surgery can be done several ways. Sometimes just the fallopian tubes are removed. But sometimes the uterus, ovaries, or both are also removed. This can change the way your surgery is done. Laparoscopic surgery. It's done with very small cuts in your belly. The doctor puts a lighted tube, or scope, and other tools through the cuts. Open surgery. The doctor makes a larger cut in the belly. Vaginal surgery. A cut is made in the vagina. Before your surgery starts, you will get medicines to prevent pain and make you sleep. What can you expect after surgery? The length of your hospital stay depends on what kind of surgery you had. You will likely go home the day of your surgery. But if your uterus was removed (hysterectomy), your hospital stay may be longer. Your return to normal activities can take from a few days to a couple of weeks. How long it takes depends on the type of surgery. It also depends on your overall health and the kind of work and other activities you do. For some people, it might take 4 to 6 weeks to fully recover. Current as of: August 2, 2022               Content Version: 13.6  © 2006-2023 [employer]. Care instructions adapted under license by [employer] (which disclaims liability or warranty for this information). If you have questions about a medical condition or this instruction, always ask your healthcare professional. [place] [address] any warranty or liability for your use of this information.

## 2023-03-30 NOTE — PROGRESS NOTES
0825-Patient up ambulating in hallway with mother. IV saline locked for ambulation. 1030-Patient up walking in hallway with mother. 1700-Dr De Oliveira notified that pt is having epigastric pain and tightness, Dr orders received for cbc and ultrasound stat. 1755-Ultrasound to floor to perform ultrasound.

## 2023-03-31 VITALS
DIASTOLIC BLOOD PRESSURE: 72 MMHG | BODY MASS INDEX: 29.73 KG/M2 | SYSTOLIC BLOOD PRESSURE: 114 MMHG | HEIGHT: 66 IN | WEIGHT: 185 LBS | HEART RATE: 82 BPM | TEMPERATURE: 98.4 F | OXYGEN SATURATION: 94 % | RESPIRATION RATE: 16 BRPM

## 2023-03-31 PROCEDURE — 74011250636 HC RX REV CODE- 250/636: Performed by: EMERGENCY MEDICINE

## 2023-03-31 PROCEDURE — 74011250637 HC RX REV CODE- 250/637: Performed by: OBSTETRICS & GYNECOLOGY

## 2023-03-31 PROCEDURE — 74011000250 HC RX REV CODE- 250: Performed by: OBSTETRICS & GYNECOLOGY

## 2023-03-31 PROCEDURE — 74011250636 HC RX REV CODE- 250/636: Performed by: OBSTETRICS & GYNECOLOGY

## 2023-03-31 PROCEDURE — 74011000250 HC RX REV CODE- 250: Performed by: EMERGENCY MEDICINE

## 2023-03-31 RX ORDER — IBUPROFEN 200 MG
1 TABLET ORAL DAILY
Status: DISCONTINUED | OUTPATIENT
Start: 2023-03-31 | End: 2023-03-31 | Stop reason: HOSPADM

## 2023-03-31 RX ORDER — OXYCODONE HYDROCHLORIDE 5 MG/1
5 TABLET ORAL
Qty: 28 TABLET | Refills: 0 | Status: SHIPPED | OUTPATIENT
Start: 2023-03-31 | End: 2023-04-07

## 2023-03-31 RX ORDER — IBUPROFEN 800 MG/1
800 TABLET ORAL 3 TIMES DAILY
Status: DISCONTINUED | OUTPATIENT
Start: 2023-03-31 | End: 2023-03-31 | Stop reason: HOSPADM

## 2023-03-31 RX ORDER — IBUPROFEN 800 MG/1
800 TABLET ORAL
Qty: 30 TABLET | Refills: 0 | Status: SHIPPED | OUTPATIENT
Start: 2023-03-31

## 2023-03-31 RX ADMIN — SODIUM CHLORIDE, PRESERVATIVE FREE 10 ML: 5 INJECTION INTRAVENOUS at 01:46

## 2023-03-31 RX ADMIN — ONDANSETRON 4 MG: 2 INJECTION INTRAMUSCULAR; INTRAVENOUS at 01:44

## 2023-03-31 RX ADMIN — OXYCODONE HYDROCHLORIDE 10 MG: 10 TABLET ORAL at 00:06

## 2023-03-31 RX ADMIN — ONDANSETRON 4 MG: 2 INJECTION INTRAMUSCULAR; INTRAVENOUS at 10:00

## 2023-03-31 RX ADMIN — SODIUM CHLORIDE, PRESERVATIVE FREE 20 MG: 5 INJECTION INTRAVENOUS at 08:07

## 2023-03-31 RX ADMIN — IBUPROFEN 800 MG: 800 TABLET, FILM COATED ORAL at 10:29

## 2023-03-31 NOTE — DISCHARGE SUMMARY
[facility] GYN Discharge Summary     Patient ID:  Flori Carrera  [id number]  33 y.o.  1985    Admit date: 3/29/2023    Discharge date and time: 3/31/23    Admitting Physician: Sravan Serrato MD     Discharge Physician: Sergo Barclay MD    Admission Diagnoses: Ovarian cyst [facility] Course: Flori Carrera had unremarkeable progressive recovery. and Eating, ambulating, and voiding in a routine manner. Significant Diagnostic Studies:   Recent Results (from the past 24 hour(s))   CBC WITH AUTOMATED DIFF    Collection Time: 03/30/23  8:01 PM   Result Value Ref Range    WBC 13.5 (H) 3.6 - 11.0 K/uL    RBC 3.72 (L) 3.80 - 5.20 M/uL    HGB 11.8 11.5 - 16.0 g/dL    HCT 35.5 35.0 - 47.0 %    MCV 95.4 80.0 - 99.0 FL    MCH 31.7 26.0 - 34.0 PG    MCHC 33.2 30.0 - 36.5 g/dL    RDW 12.9 11.5 - 14.5 %    PLATELET 468 173 - 309 K/uL    MPV 9.8 8.9 - 12.9 FL    NRBC 0.0 0.0  WBC    ABSOLUTE NRBC 0.00 0.00 - 0.01 K/uL    NEUTROPHILS 86 (H) 32 - 75 %    LYMPHOCYTES 11 (L) 12 - 49 %    MONOCYTES 3 (L) 5 - 13 %    EOSINOPHILS 0 0 - 7 %    BASOPHILS 0 0 - 1 %    IMMATURE GRANULOCYTES 0 0 - 0.5 %    ABS. NEUTROPHILS 11.5 (H) 1.8 - 8.0 K/UL    ABS. LYMPHOCYTES 1.5 0.8 - 3.5 K/UL    ABS. MONOCYTES 0.4 0.0 - 1.0 K/UL    ABS. EOSINOPHILS 0.0 0.0 - 0.4 K/UL    ABS. BASOPHILS 0.1 0.0 - 0.1 K/UL    ABS. IMM.  GRANS. 0.0 0.00 - 0.04 K/UL    DF AUTOMATED           Procedures: Left Salpingo-Oophorectomy( partial of ovary per OP note)    Discharge Exam:  Visit Vitals  /72 (BP 1 Location: Left upper arm, BP Patient Position: Semi fowlers)   Pulse 82   Temp 98.4 °F (36.9 °C)   Resp 16   Ht 5' 6\" (1.676 m)   Wt 185 lb (83.9 kg)   LMP 03/24/2023 (Exact Date)   SpO2 94%   BMI 29.86 kg/m²          Patient Instructions:   Current Discharge Medication List        START taking these medications    Details   oxyCODONE IR (ROXICODONE) 5 mg immediate release tablet Take 1 Tablet by mouth every four (4) hours as needed for Pain for up to 7 days. Max Daily Amount: 30 mg.  Qty: 28 Tablet, Refills: 0    Associated Diagnoses: S/P laparotomy      ibuprofen (MOTRIN) 800 mg tablet Take 1 Tablet by mouth every eight (8) hours as needed for Pain. Qty: 30 Tablet, Refills: 0            Activity: physical activity is restricted per discharge instructions  Diet: resume normal diet  Wound Care: Keep wound clean and dry  Disposition: Home  Follow-up in 4 weeks.     Signed:  Bri Villegas MD  3/31/2023  9:43 AM

## 2023-03-31 NOTE — PROGRESS NOTES
Problem: Surgical Pathway Post-Op Day 1  Goal: Activity/Safety  Outcome: Resolved/Met  Goal: Diagnostic Test/Procedures  Outcome: Resolved/Met  Goal: Nutrition/Diet  Outcome: Resolved/Met  Goal: Discharge Planning  Outcome: Resolved/Met  Goal: Medications  Outcome: Resolved/Met  Goal: Respiratory  Outcome: Resolved/Met  Goal: Treatments/Interventions/Procedures  Outcome: Resolved/Met  Goal: Psychosocial  Outcome: Resolved/Met  Goal: *No signs and symptoms of infection or wound complications  Outcome: Resolved/Met  Goal: *Optimal pain control at patient's stated goal  Outcome: Resolved/Met  Goal: *Adequate urinary output (equal to or greater than 30 milliliters/hour)  Outcome: Resolved/Met  Goal: *Hemodynamically stable  Outcome: Resolved/Met  Goal: *Tolerating diet  Outcome: Resolved/Met  Goal: *Demonstrates progressive activity  Outcome: Resolved/Met  Goal: *Lungs clear or at baseline  Outcome: Resolved/Met     Problem: Falls - Risk of  Goal: *Absence of Falls  Description: Document [place] Fall Risk and appropriate interventions in the flowsheet. Outcome: Resolved/Met  Note: Fall Risk Interventions:            Medication Interventions: Patient to call before getting OOB, Teach patient to arise slowly                   Problem: Patient Education: Go to Patient Education Activity  Goal: Patient/Family Education  Outcome: Resolved/Met     1330 Discharge instructions reviewed with patient. Patient aware Rx's were sent to pharmacy and aware when can take next dose. Aware of warning signs and when to notify MD.     Discharge plan of care/case management plan validated with provider discharge order. 1400 Patient discharged to main entrance via wheelchair in stable condition .

## 2023-04-04 ENCOUNTER — TELEPHONE (OUTPATIENT)
Dept: OBGYN CLINIC | Age: 38
End: 2023-04-04

## 2023-04-04 NOTE — TELEPHONE ENCOUNTER
Called patient back to see if she wanted to schedule her appointment, but patient did not answer phone lvm on patient phone.  At 3:58 pm

## 2023-04-11 PROBLEM — N80.9 ENDOMETRIOSIS: Status: ACTIVE | Noted: 2023-04-11

## 2023-04-17 ENCOUNTER — TELEPHONE (OUTPATIENT)
Dept: OBGYN CLINIC | Age: 38
End: 2023-04-17

## 2023-04-17 NOTE — TELEPHONE ENCOUNTER
Attempted to contact patient to advise her that [facility] paperwork completed but cannot be released to her until she has completed her portion. We will need to make a copy.

## 2023-04-28 ENCOUNTER — OFFICE VISIT (OUTPATIENT)
Dept: OBGYN CLINIC | Age: 38
End: 2023-04-28

## 2023-04-28 VITALS — DIASTOLIC BLOOD PRESSURE: 88 MMHG | SYSTOLIC BLOOD PRESSURE: 160 MMHG | WEIGHT: 190 LBS | BODY MASS INDEX: 30.67 KG/M2

## 2023-04-28 DIAGNOSIS — Z09 FOLLOW-UP EXAMINATION, FOLLOWING OTHER SURGERY: Primary | ICD-10-CM

## 2023-04-28 DIAGNOSIS — N80.9 ENDOMETRIOSIS: ICD-10-CM

## 2023-04-28 RX ORDER — MEDROXYPROGESTERONE ACETATE 150 MG/ML
150 INJECTION, SUSPENSION INTRAMUSCULAR ONCE
Qty: 1 ML | Refills: 3 | Status: SHIPPED | OUTPATIENT
Start: 2023-04-28 | End: 2023-04-28

## 2023-04-28 NOTE — PROGRESS NOTES
Sara Upton is a 45 y.o. female who presents today for the following:  Chief Complaint   Patient presents with    Post OP Follow Up          HPI    Patient presents for postoperative visit status post diagnostic laparoscopy requiring conversion to laparotomy for left ovarian endometrioma. She reports she is doing well today. CT scan performed prior to the procedure revealed a breast lesion. She is otherwise without complaints    OB History          2    Para   2    Term   2            AB        Living   2         SAB        IAB        Ectopic        Molar        Multiple        Live Births   2                   BP (!) 160/88 (BP 1 Location: Right upper arm, BP Patient Position: Sitting, BP Cuff Size: Small adult)   Wt 190 lb (86.2 kg)   LMP 2023 (Exact Date)   BMI 30.67 kg/m²    OBGyn Exam   Patient is well-developed well-nourished female no apparent distress  She is alert and oriented x3  Wounds are well-healed  No results found for this visit on 23. Assessment:  1 month postop visit status post laparotomy for endometrioma  Plan: We will obtain serum pregnancy and started on Depo-Provera for suppression of endometriosis. Mammogram ordered secondary to breast lesion on CT. Follow-up as needed and yearly. No orders of the defined types were placed in this encounter.

## 2023-05-01 ENCOUNTER — TELEPHONE (OUTPATIENT)
Dept: OBGYN CLINIC | Age: 38
End: 2023-05-01

## 2023-05-01 DIAGNOSIS — N91.2 AMENORRHEA: Primary | ICD-10-CM

## 2023-05-01 NOTE — TELEPHONE ENCOUNTER
Patient in for a serum pregnancy test to start Depo Provera. Lab order entered and she is aware to contact the office tomorrow morning for results and if negative she will come in for her injection tomorrow.

## 2023-05-02 ENCOUNTER — OFFICE VISIT (OUTPATIENT)
Dept: OBGYN CLINIC | Age: 38
End: 2023-05-02
Payer: COMMERCIAL

## 2023-05-02 VITALS — DIASTOLIC BLOOD PRESSURE: 86 MMHG | BODY MASS INDEX: 30.51 KG/M2 | WEIGHT: 189 LBS | SYSTOLIC BLOOD PRESSURE: 152 MMHG

## 2023-05-02 DIAGNOSIS — N94.89 SUPPRESSION OF MENSES: Primary | ICD-10-CM

## 2023-05-02 LAB — HCG INTACT+B SERPL-ACNC: <1 MIU/ML

## 2023-05-02 PROCEDURE — 96372 THER/PROPH/DIAG INJ SC/IM: CPT | Performed by: OBSTETRICS & GYNECOLOGY

## 2023-05-02 RX ORDER — MEDROXYPROGESTERONE ACETATE 150 MG/ML
150 INJECTION, SUSPENSION INTRAMUSCULAR ONCE
Status: COMPLETED | OUTPATIENT
Start: 2023-05-02 | End: 2023-05-02

## 2023-05-02 RX ORDER — MEDROXYPROGESTERONE ACETATE 150 MG/ML
150 INJECTION, SUSPENSION INTRAMUSCULAR ONCE
COMMUNITY

## 2023-05-02 RX ADMIN — MEDROXYPROGESTERONE ACETATE 150 MG: 150 INJECTION, SUSPENSION INTRAMUSCULAR at 13:30

## 2023-05-04 DIAGNOSIS — R92.8 ABNORMAL MAMMOGRAM: Primary | ICD-10-CM

## 2023-05-16 RX ORDER — IBUPROFEN 800 MG/1
800 TABLET ORAL EVERY 8 HOURS PRN
COMMUNITY
Start: 2023-03-31

## 2023-05-16 RX ORDER — MEDROXYPROGESTERONE ACETATE 150 MG/ML
150 INJECTION, SUSPENSION INTRAMUSCULAR ONCE
COMMUNITY

## 2023-05-22 ENCOUNTER — HOSPITAL ENCOUNTER (OUTPATIENT)
Facility: HOSPITAL | Age: 38
Discharge: HOME | End: 2023-05-25
Payer: COMMERCIAL

## 2023-05-22 DIAGNOSIS — R92.8 ABNORMAL MAMMOGRAM: ICD-10-CM

## 2023-05-22 PROCEDURE — 76642 ULTRASOUND BREAST LIMITED: CPT

## 2023-05-22 PROCEDURE — G0279 TOMOSYNTHESIS, MAMMO: HCPCS

## 2023-08-25 ENCOUNTER — OFFICE VISIT (OUTPATIENT)
Age: 38
End: 2023-08-25
Payer: COMMERCIAL

## 2023-08-25 VITALS — SYSTOLIC BLOOD PRESSURE: 157 MMHG | WEIGHT: 196 LBS | BODY MASS INDEX: 31.64 KG/M2 | DIASTOLIC BLOOD PRESSURE: 89 MMHG

## 2023-08-25 DIAGNOSIS — Z01.419 WELL WOMAN EXAM WITH ROUTINE GYNECOLOGICAL EXAM: Primary | ICD-10-CM

## 2023-08-25 PROCEDURE — 99395 PREV VISIT EST AGE 18-39: CPT | Performed by: OBSTETRICS & GYNECOLOGY

## 2023-09-06 LAB
., LABCORP: ABNORMAL
C TRACH RRNA CVX QL NAA+PROBE: NEGATIVE
CYTOLOGIST CVX/VAG CYTO: ABNORMAL
CYTOLOGY CVX/VAG DOC CYTO: ABNORMAL
CYTOLOGY CVX/VAG DOC THIN PREP: ABNORMAL
DX ICD CODE: ABNORMAL
DX ICD CODE: ABNORMAL
HPV I/H RISK 4 DNA CVX QL PROBE+SIG AMP: NEGATIVE
Lab: ABNORMAL
N GONORRHOEA RRNA CVX QL NAA+PROBE: NEGATIVE
OTHER STN SPEC: ABNORMAL
PATHOLOGIST CVX/VAG CYTO: ABNORMAL
RECOM F/U CVX/VAG CYTO: ABNORMAL
STAT OF ADQ CVX/VAG CYTO-IMP: ABNORMAL
T VAGINALIS RRNA SPEC QL NAA+PROBE: NEGATIVE

## 2023-09-28 ENCOUNTER — HOSPITAL ENCOUNTER (OUTPATIENT)
Facility: HOSPITAL | Age: 38
End: 2023-09-28
Payer: COMMERCIAL

## 2023-09-28 ENCOUNTER — HOSPITAL ENCOUNTER (OUTPATIENT)
Facility: HOSPITAL | Age: 38
Discharge: HOME | End: 2023-09-28
Attending: SURGERY
Payer: COMMERCIAL

## 2023-09-28 DIAGNOSIS — R92.8 ABNORMAL MAMMOGRAM: ICD-10-CM

## 2023-09-28 DIAGNOSIS — N63.20 MASS OF LEFT BREAST, UNSPECIFIED QUADRANT: ICD-10-CM

## 2023-09-28 PROCEDURE — 88305 TISSUE EXAM BY PATHOLOGIST: CPT

## 2023-09-28 PROCEDURE — A4648 IMPLANTABLE TISSUE MARKER: HCPCS

## 2023-09-28 PROCEDURE — 19083 BX BREAST 1ST LESION US IMAG: CPT

## 2023-09-28 RX ORDER — LIDOCAINE HYDROCHLORIDE AND EPINEPHRINE 10; 10 MG/ML; UG/ML
3 INJECTION, SOLUTION INFILTRATION; PERINEURAL ONCE
Status: DISCONTINUED | OUTPATIENT
Start: 2023-09-28 | End: 2023-10-02 | Stop reason: HOSPADM

## 2023-09-28 RX ORDER — LIDOCAINE HYDROCHLORIDE 10 MG/ML
5 INJECTION, SOLUTION INFILTRATION; PERINEURAL ONCE
Status: DISCONTINUED | OUTPATIENT
Start: 2023-09-28 | End: 2023-10-02 | Stop reason: HOSPADM

## (undated) DEVICE — LAPAROSCOPIC SCISSORS: Brand: EPIX LAPAROSCOPIC SCISSORS

## (undated) DEVICE — E-Z CLEAN, PTFE COATED, ELECTROSURGICAL LAPAROSCOPIC ELECTRODE, L-HOOK, 33 CM., SINGLE-USE, FOR USE WITH HAND CONTROL PENCIL: Brand: MEGADYNE

## (undated) DEVICE — 3-0 COATED VICRYL PLUS UNDYED 1X27" SH --

## (undated) DEVICE — INSUFFLATION NEEDLE TO ESTABLISH PNEUMOPERITONEUM.: Brand: INSUFFLATION NEEDLE

## (undated) DEVICE — AGENT HEMSTAT W4XL8IN OXIDIZED REGENERATED CELOS ABSRB

## (undated) DEVICE — GARMENT,MEDLINE,DVT,INT,CALF,MED, GEN2: Brand: MEDLINE

## (undated) DEVICE — DRAPE,REIN 53X77,STERILE: Brand: MEDLINE

## (undated) DEVICE — GLOVE SURG SZ 75 L12IN FNGR THK79MIL GRN LTX FREE

## (undated) DEVICE — SUTURE MCRYL + SZ 3-0 L27IN ABSRB UD PS1 L24MM 3/8 CIR REV MCP936H

## (undated) DEVICE — PREP SKN CHLRAPRP APL 26ML STR --

## (undated) DEVICE — SOLUTION ANTIFOG VIS SYS CLEARIFY LAPSCP

## (undated) DEVICE — HYPODERMIC SAFETY NEEDLE: Brand: MONOJECT

## (undated) DEVICE — TROCAR: Brand: KII SLEEVE

## (undated) DEVICE — Device

## (undated) DEVICE — GLOVE ORANGE PI 7   MSG9070

## (undated) DEVICE — TUBING INSUFFLATOR HEAT HUMIDIFIED SMK EVAC SET PNEUMOCLEAR

## (undated) DEVICE — SOUTHSIDE TURNOVER: Brand: MEDLINE INDUSTRIES, INC.

## (undated) DEVICE — SUT MONOCRYL PLUS UD 4-0 --

## (undated) DEVICE — SPONGE LAP SFT 18X18 IN X RAY DETECTABLE

## (undated) DEVICE — TROCAR: Brand: KII FIOS FIRST ENTRY

## (undated) DEVICE — SEALER ENDOSCP NANO COAT OPN DIV CRV L JAW LIGASURE IMPACT

## (undated) DEVICE — SYR 10ML LUER LOK 1/5ML GRAD --

## (undated) DEVICE — ADHESIVE SKIN CLSR 0.7ML TOP DERMBND ADV

## (undated) DEVICE — LAPAROSCOPIC DISSECTOR: Brand: DEROYAL

## (undated) DEVICE — SUTURE VCRL + SZ 0 L27IN ABSRB UD CT-1 L36MM 1/2 CIR TAPR VCP260H

## (undated) DEVICE — INTENDED FOR TISSUE SEPARATION, AND OTHER PROCEDURES THAT REQUIRE A SHARP SURGICAL BLADE TO PUNCTURE OR CUT.: Brand: BARD-PARKER ® CARBON RIB-BACK BLADES